# Patient Record
Sex: MALE | Race: WHITE | URBAN - METROPOLITAN AREA
[De-identification: names, ages, dates, MRNs, and addresses within clinical notes are randomized per-mention and may not be internally consistent; named-entity substitution may affect disease eponyms.]

---

## 2017-01-13 ENCOUNTER — OFFICE VISIT (OUTPATIENT)
Dept: INTERNAL MEDICINE CLINIC | Age: 32
End: 2017-01-13

## 2017-01-13 VITALS
WEIGHT: 139 LBS | BODY MASS INDEX: 22.34 KG/M2 | OXYGEN SATURATION: 100 % | HEART RATE: 77 BPM | RESPIRATION RATE: 16 BRPM | DIASTOLIC BLOOD PRESSURE: 77 MMHG | TEMPERATURE: 97.5 F | SYSTOLIC BLOOD PRESSURE: 112 MMHG | HEIGHT: 66 IN

## 2017-01-13 DIAGNOSIS — J06.9 VIRAL UPPER RESPIRATORY TRACT INFECTION: Primary | ICD-10-CM

## 2017-01-13 DIAGNOSIS — R05.9 COUGH: ICD-10-CM

## 2017-01-13 RX ORDER — HYDROCODONE POLISTIREX AND CHLORPHENIRAMINE POLISTIREX 10; 8 MG/5ML; MG/5ML
5 SUSPENSION, EXTENDED RELEASE ORAL
Qty: 70 ML | Refills: 0 | Status: SHIPPED | OUTPATIENT
Start: 2017-01-13 | End: 2017-01-20

## 2017-01-13 NOTE — PATIENT INSTRUCTIONS
Cough: Care Instructions  Your Care Instructions  A cough is your body's response to something that bothers your throat or airways. Many things can cause a cough. You might cough because of a cold or the flu, bronchitis, or asthma. Smoking, postnasal drip, allergies, and stomach acid that backs up into your throat also can cause coughs. A cough is a symptom, not a disease. Most coughs stop when the cause, such as a cold, goes away. You can take a few steps at home to cough less and feel better. Follow-up care is a key part of your treatment and safety. Be sure to make and go to all appointments, and call your doctor if you are having problems. It's also a good idea to know your test results and keep a list of the medicines you take. How can you care for yourself at home? · Drink lots of water and other fluids. This helps thin the mucus and soothes a dry or sore throat. Honey or lemon juice in hot water or tea may ease a dry cough. · Take cough medicine as directed by your doctor. · Prop up your head on pillows to help you breathe and ease a dry cough. · Try cough drops to soothe a dry or sore throat. Cough drops don't stop a cough. Medicine-flavored cough drops are no better than candy-flavored drops or hard candy. · Do not smoke. Avoid secondhand smoke. If you need help quitting, talk to your doctor about stop-smoking programs and medicines. These can increase your chances of quitting for good. When should you call for help? Call 911 anytime you think you may need emergency care. For example, call if:  · You have severe trouble breathing. Call your doctor now or seek immediate medical care if:  · You cough up blood. · You have new or worse trouble breathing. · You have a new or higher fever. · You have a new rash.   Watch closely for changes in your health, and be sure to contact your doctor if:  · You cough more deeply or more often, especially if you notice more mucus or a change in the color of your mucus. · You have new symptoms, such as a sore throat, an earache, or sinus pain. · You do not get better as expected. Where can you learn more? Go to http://preston-gilbert.info/. Enter D279 in the search box to learn more about \"Cough: Care Instructions. \"  Current as of: May 27, 2016  Content Version: 11.1  © 2006-2016 [employer]. Care instructions adapted under license by [employer] (which disclaims liability or warranty for this information). If you have questions about a medical condition or this instruction, always ask your healthcare professional. [place] [address] any warranty or liability for your use of this information. Saline Nasal Washes: Care Instructions  Your Care Instructions  Saline nasal washes help keep the nasal passages open by washing out thick or dried mucus. This simple remedy can help relieve symptoms of allergies, sinusitis, and colds. It also can make the nose feel more comfortable by keeping the mucous membranes moist. You may notice a little burning sensation in your nose the first few times you use the solution, but this usually gets better in a few days. Follow-up care is a key part of your treatment and safety. Be sure to make and go to all appointments, and call your doctor if you are having problems. It's also a good idea to know your test results and keep a list of the medicines you take. How can you care for yourself at home? · You can buy premixed saline solution in a squeeze bottle or other sinus rinse products at a drugstore. Read and follow the instructions on the label. · You also can make your own saline solution by adding 1 teaspoon of salt and 1 teaspoon of baking soda to 2 cups of distilled water. · If you use a homemade solution, pour a small amount into a clean bowl. Using a rubber bulb syringe, squeeze the syringe and place the tip in the salt water.  Pull a small amount of the salt water into the syringe by relaxing your hand. · Sit down with your head tilted slightly back. Do not lie down. Put the tip of the bulb syringe or the squeeze bottle a little way into one of your nostrils. Gently drip or squirt a few drops into the nostril. Repeat with the other nostril. Some sneezing and gagging are normal at first.  · Gently blow your nose. · Wipe the syringe or bottle tip clean after each use. · Repeat this 2 or 3 times a day. · Use nasal washes gently if you have nosebleeds often. When should you call for help? Watch closely for changes in your health, and be sure to contact your doctor if:  · You often get nosebleeds. · You have problems doing the nasal washes. Where can you learn more? Go to http://preston-gilbert.info/. Enter 071 [id number] [id number] in the search box to learn more about \"Saline Nasal Washes: Care Instructions. \"  Current as of: July 29, 2016  Content Version: 11.1  © 5867-6360 [employer]. Care instructions adapted under license by [facility] (which disclaims liability or warranty for this information). If you have questions about a medical condition or this instruction, always ask your healthcare professional. [place] [address] any warranty or liability for your use of this information.

## 2017-01-13 NOTE — LETTER
NOTIFICATION RETURN TO WORK  
 
1/13/2017 9:06 AM 
 
Mr. Rick Nagy 
[facility]  [address] [place] [id number] To Whom It May Concern: 
 
Rick Nagy is currently under the care of [address]. He will return to work on: 1/14/2017. If there are questions or concerns please have the patient contact our office. Sincerely, Kiran Connor PA-C

## 2017-01-13 NOTE — MR AVS SNAPSHOT
Visit Information Date & Time Provider Department Dept. Phone Encounter #  
 1/13/2017  8:30 AM Oneta June [address] [phone] [id number] Follow-up Instructions Return if symptoms worsen or fail to improve. Upcoming Health Maintenance Date Due DTaP/Tdap/Td series (1 - Tdap) 1/1/2005 INFLUENZA AGE 9 TO ADULT 8/1/2016 Allergies as of 1/13/2017  Never Reviewed Severity Noted Reaction Type Reactions Atripla [Efavirenz-emtricitabin-tenofov]  01/13/2017    Nausea and Vomiting With hallucinations Fluoride Susy [Sodium Fluoride]  01/13/2017    Other (comments) Sore throat Current Immunizations  Never Reviewed No immunizations on file. Not reviewed this visit You Were Diagnosed With   
  
 Codes Comments Viral upper respiratory tract infection    -  Primary ICD-10-CM: J06.9, B97.89 ICD-9-CM: 465.9 Cough     ICD-10-CM: R05 ICD-9-CM: 803. 2 Vitals BP Pulse Temp Resp Height(growth percentile) Weight(growth percentile) 112/77 (BP 1 Location: Left arm, BP Patient Position: Sitting) 77 97.5 °F (36.4 °C) (Oral) 16 5' 6\" (1.676 m) 139 lb (63 kg) SpO2 BMI  
  
  
  
 100% 22.44 kg/m2 BMI and BSA Data Body Mass Index Body Surface Area  
 22.44 kg/m 2 1.71 m 2 Your Updated Medication List  
  
   
This list is accurate as of: 1/13/17  9:06 AM.  Always use your most recent med list.  
  
  
  
  
 chlorpheniramine-HYDROcodone 10-8 mg/5 mL suspension Commonly known as:  Ana Favorite Take 5 mL by mouth every twelve (12) hours as needed for Cough for up to 7 days. Max Daily Amount: 10 mL. Prescriptions Printed Refills  
 chlorpheniramine-HYDROcodone (TUSSIONEX) 10-8 mg/5 mL suspension 0 Sig: Take 5 mL by mouth every twelve (12) hours as needed for Cough for up to 7 days. Max Daily Amount: 10 mL. Class: Print Route: Oral  
  
Follow-up Instructions Return if symptoms worsen or fail to improve. Patient Instructions Cough: Care Instructions Your Care Instructions A cough is your body's response to something that bothers your throat or airways. Many things can cause a cough. You might cough because of a cold or the flu, bronchitis, or asthma. Smoking, postnasal drip, allergies, and stomach acid that backs up into your throat also can cause coughs. A cough is a symptom, not a disease. Most coughs stop when the cause, such as a cold, goes away. You can take a few steps at home to cough less and feel better. Follow-up care is a key part of your treatment and safety. Be sure to make and go to all appointments, and call your doctor if you are having problems. It's also a good idea to know your test results and keep a list of the medicines you take. How can you care for yourself at home? · Drink lots of water and other fluids. This helps thin the mucus and soothes a dry or sore throat. Honey or lemon juice in hot water or tea may ease a dry cough. · Take cough medicine as directed by your doctor. · Prop up your head on pillows to help you breathe and ease a dry cough. · Try cough drops to soothe a dry or sore throat. Cough drops don't stop a cough. Medicine-flavored cough drops are no better than candy-flavored drops or hard candy. · Do not smoke. Avoid secondhand smoke. If you need help quitting, talk to your doctor about stop-smoking programs and medicines. These can increase your chances of quitting for good. When should you call for help? Call 911 anytime you think you may need emergency care. For example, call if: 
· You have severe trouble breathing. Call your doctor now or seek immediate medical care if: 
· You cough up blood. · You have new or worse trouble breathing. · You have a new or higher fever. · You have a new rash. Watch closely for changes in your health, and be sure to contact your doctor if: · You cough more deeply or more often, especially if you notice more mucus or a change in the color of your mucus. · You have new symptoms, such as a sore throat, an earache, or sinus pain. · You do not get better as expected. Where can you learn more? Go to http://preston-gilbert.info/. Enter D279 in the search box to learn more about \"Cough: Care Instructions. \" Current as of: May 27, 2016 Content Version: 11.1 © 5239-3932 [employer]. Care instructions adapted under license by [employer] (which disclaims liability or warranty for this information). If you have questions about a medical condition or this instruction, always ask your healthcare professional. [address] any warranty or liability for your use of this information. Saline Nasal Washes: Care Instructions Your Care Instructions Saline nasal washes help keep the nasal passages open by washing out thick or dried mucus. This simple remedy can help relieve symptoms of allergies, sinusitis, and colds. It also can make the nose feel more comfortable by keeping the mucous membranes moist. You may notice a little burning sensation in your nose the first few times you use the solution, but this usually gets better in a few days. Follow-up care is a key part of your treatment and safety. Be sure to make and go to all appointments, and call your doctor if you are having problems. It's also a good idea to know your test results and keep a list of the medicines you take. How can you care for yourself at home? · You can buy premixed saline solution in a squeeze bottle or other sinus rinse products at a drugstore. Read and follow the instructions on the label. · You also can make your own saline solution by adding 1 teaspoon of salt and 1 teaspoon of baking soda to 2 cups of distilled water. · If you use a homemade solution, pour a small amount into a clean bowl. Using a rubber bulb syringe, squeeze the syringe and place the tip in the salt water. Pull a small amount of the salt water into the syringe by relaxing your hand. · Sit down with your head tilted slightly back. Do not lie down. Put the tip of the bulb syringe or the squeeze bottle a little way into one of your nostrils. Gently drip or squirt a few drops into the nostril. Repeat with the other nostril. Some sneezing and gagging are normal at first. 
· Gently blow your nose. · Wipe the syringe or bottle tip clean after each use. · Repeat this 2 or 3 times a day. · Use nasal washes gently if you have nosebleeds often. When should you call for help? Watch closely for changes in your health, and be sure to contact your doctor if: 
· You often get nosebleeds. · You have problems doing the nasal washes. Where can you learn more? Go to http://preston-gilbert.info/. Enter [id number] in the search box to learn more about \"Saline Nasal Washes: Care Instructions. \" Current as of: July 29, 2016 Content Version: 11.1 © 3609-4243 [employer]. Care instructions adapted under license by Lola Pirindola (which disclaims liability or warranty for this information). If you have questions about a medical condition or this instruction, always ask your healthcare professional. [address] any warranty or liability for your use of this information. Introducing [facility]! Mike Kelley introduces [employer] patient portal. Now you can access parts of your medical record, email your doctor's office, and request medication refills online. 1. In your internet browser, go to [email or web address] 2. Click on the First Time User? Click Here link in the Sign In box. You will see the New Member Sign Up page. 3. Enter your [employer] Access Code exactly as it appears below. You will not need to use this code after youve completed the sign-up process.  If you do not sign up before the expiration date, you must request a new code. · [employer] Access Code: [email or web address] Expires: 4/13/2017  9:06 AM 
 
4. Enter the last four digits of your Social Security Number (xxxx) and Date of Birth (mm/dd/yyyy) as indicated and click Submit. You will be taken to the next sign-up page. 5. Create a [employer]cept ID. This will be your [employer] login ID and cannot be changed, so think of one that is secure and easy to remember. 6. Create a [employer] password. You can change your password at any time. 7. Enter your Password Reset Question and Answer. This can be used at a later time if you forget your password. 8. Enter your e-mail address. You will receive e-mail notification when new information is available in [address]. 9. Click Sign Up. You can now view and download portions of your medical record. 10. Click the Download Summary menu link to download a portable copy of your medical information. If you have questions, please visit the Frequently Asked Questions section of the [employer] website. Remember, [employer] is NOT to be used for urgent needs. For medical emergencies, dial 911. Now available from your iPhone and Android! Please provide this summary of care documentation to your next provider. If you have any questions after today's visit, please call [phone].

## 2017-01-13 NOTE — PROGRESS NOTES
HISTORY OF PRESENT ILLNESS  Bjorn Albright is a 34 y.o. male. HPI Comments: Pt presents with 4 days of nasal congestion and 3 days of cough. He also has some ringing in the ears for the past two days. He has been taking an herbal complex (congplex) and guiafenesin for his symptoms. Alkaseltzer cold and flu did not seem to help. 10 mg tivicay  30 mg prezcobix  Daily for HIV    Cold Symptoms   Associated symptoms include sore throat. Pertinent negatives include no chest pain, no chills, no ear pain, no headaches, no myalgias, no nausea and no vomiting. Review of Systems   Constitutional: Positive for fever (2 days ago) and malaise/fatigue. Negative for chills. HENT: Positive for congestion, sore throat and tinnitus. Negative for ear pain. Eyes: Negative for pain and discharge. Respiratory: Positive for cough and sputum production (greenish). Cardiovascular: Negative for chest pain and palpitations. Gastrointestinal: Negative for nausea and vomiting. Musculoskeletal: Negative for back pain and myalgias. Skin: Negative for rash. Neurological: Positive for weakness. Negative for dizziness and headaches. Visit Vitals    /77 (BP 1 Location: Left arm, BP Patient Position: Sitting)    Pulse 77    Temp 97.5 °F (36.4 °C) (Oral)    Resp 16    Ht 5' 6\" (1.676 m)    Wt 139 lb (63 kg)    SpO2 100%    BMI 22.44 kg/m2       Physical Exam   Constitutional: He is oriented to person, place, and time. Vital signs are normal. He appears well-developed and well-nourished. He is cooperative. He does not appear ill. No distress. HENT:   Head: Normocephalic and atraumatic. Right Ear: External ear and ear canal normal. Tympanic membrane is injected (mild erythema noted on periphery of TM and bordering ear ossicles). Left Ear: Tympanic membrane, external ear and ear canal normal. Tympanic membrane is not injected. Nose: Nose normal. No mucosal edema or rhinorrhea (scant mucus).    Mouth/Throat: Uvula is midline, oropharynx is clear and moist and mucous membranes are normal.   Eyes: Conjunctivae are normal.   Neck: Neck supple. Cardiovascular: Normal rate, regular rhythm and normal heart sounds. Exam reveals no gallop and no friction rub. No murmur heard. Pulses:       Radial pulses are 2+ on the right side, and 2+ on the left side. Pulmonary/Chest: Effort normal and breath sounds normal. No respiratory distress. He has no decreased breath sounds. He has no wheezes. He has no rhonchi. He has no rales. Lymphadenopathy:        Head (right side): Tonsillar adenopathy present. No submental, no submandibular, no preauricular, no posterior auricular and no occipital adenopathy present. Head (left side): Tonsillar adenopathy present. No submental, no submandibular, no preauricular, no posterior auricular and no occipital adenopathy present. He has no cervical adenopathy. Neurological: He is alert and oriented to person, place, and time. Skin: Skin is warm and dry. He is not diaphoretic. Psychiatric: He has a normal mood and affect. His speech is normal and behavior is normal. Thought content normal.   Nursing note and vitals reviewed. ASSESSMENT and PLAN    ICD-10-CM ICD-9-CM    1. Viral upper respiratory tract infection J06.9 465.9     B97.89     2. Cough R05 786.2 chlorpheniramine-HYDROcodone (TUSSIONEX) 10-8 mg/5 mL suspension     Provided after-visit information on  Cough and sinus rinse. Reviewed reasons to return or seek emergency care.     Tobin Cornejo PA-C

## 2017-01-31 ENCOUNTER — OFFICE VISIT (OUTPATIENT)
Dept: INTERNAL MEDICINE CLINIC | Age: 32
End: 2017-01-31

## 2017-01-31 VITALS
DIASTOLIC BLOOD PRESSURE: 93 MMHG | HEIGHT: 66 IN | SYSTOLIC BLOOD PRESSURE: 133 MMHG | TEMPERATURE: 95.6 F | OXYGEN SATURATION: 99 % | RESPIRATION RATE: 16 BRPM | HEART RATE: 77 BPM | BODY MASS INDEX: 22.5 KG/M2 | WEIGHT: 140 LBS

## 2017-01-31 DIAGNOSIS — H66.92 OTITIS, LEFT: Primary | ICD-10-CM

## 2017-01-31 RX ORDER — NEOMYCIN SULFATE, POLYMYXIN B SULFATE AND HYDROCORTISONE 10; 3.5; 1 MG/ML; MG/ML; [USP'U]/ML
3 SUSPENSION/ DROPS AURICULAR (OTIC) 4 TIMES DAILY
Qty: 10 ML | Refills: 0 | Status: SHIPPED | OUTPATIENT
Start: 2017-01-31 | End: 2017-02-10

## 2017-01-31 NOTE — PROGRESS NOTES
Pt presented today with left ear fullness and ringing x 3-4 days. Has pt had any falls since last visit? No.  Pt preferred language for health care discussion is english. Advanced Directive? No    Is someone accompanying this pt? No    Is the patient using any DME equipment during OV? No      1. Have you been to the ER, urgent care clinic since your last visit? Hospitalized since your last visit? No    2. Have you seen or consulted any other health care providers outside of the [address] [place] [address] since your last visit? Include any colon screening. No      Pt has a reminder for a \"due or due soon\" health maintenance. I have asked that he contact his primary care provider for follow-up on this health maintenance.

## 2017-01-31 NOTE — MR AVS SNAPSHOT
Visit Information Date & Time Provider Department Dept. Phone Encounter #  
 1/31/2017 11:15 AM Jeanette Law NP [address] [phone] [id number] Upcoming Health Maintenance Date Due DTaP/Tdap/Td series (1 - Tdap) 1/1/2005 INFLUENZA AGE 9 TO ADULT 8/1/2016 Allergies as of 1/31/2017  Review Complete On: 1/31/2017 By: Jeanette Law NP Severity Noted Reaction Type Reactions Atripla [Efavirenz-emtricitabin-tenofov]  01/13/2017    Nausea and Vomiting With hallucinations Fluoride Susy [Sodium Fluoride]  01/13/2017    Other (comments) Sore throat Current Immunizations  Never Reviewed No immunizations on file. Not reviewed this visit You Were Diagnosed With   
  
 Codes Comments Otitis, left    -  Primary ICD-10-CM: H66.92 
ICD-9-CM: 382. 9 Vitals BP Pulse Temp Resp Height(growth percentile) Weight(growth percentile) (!) 133/93 (BP 1 Location: Right arm, BP Patient Position: Sitting) 77 95.6 °F (35.3 °C) (Oral) 16 5' 6\" (1.676 m) 140 lb (63.5 kg) SpO2 BMI Smoking Status 99% 22.6 kg/m2 Current Some Day Smoker Vitals History BMI and BSA Data Body Mass Index Body Surface Area  
 22.6 kg/m 2 1.72 m 2 Preferred Pharmacy Pharmacy Name Phone [facility] [address] Your Updated Medication List  
  
   
This list is accurate as of: 1/31/17 12:03 PM.  Always use your most recent med list.  
  
  
  
  
 neomycin-polymyxin-hydrocortisone (buffered) 3.5-10,000-1 mg/mL-unit/mL-% otic suspension Commonly known as:  Sim Nivia Administer 3 Drops into each ear four (4) times daily for 10 days. Indications: Otitis Externa PREZCOBIX PO Take 30 mg by mouth daily. Indications: HIV  
  
 TIVICAY 10 mg Tab Generic drug:  dolutegravir Take  by mouth. Prescriptions Sent to Pharmacy Refills neomycin-polymyxin-hydrocortisone, buffered, (PEDIOTIC) 3.5-10,000-1 mg/mL-unit/mL-% otic suspension 0 Sig: Administer 3 Drops into each ear four (4) times daily for 10 days. Indications: Otitis Externa Class: Normal  
 Pharmacy: [facility], [address] #: [phone] Route: Both Ears Patient Instructions Swimmer's Ear: Care Instructions Your Care Instructions Swimmer's ear (otitis externa) is inflammation or infection of the ear canal. This is the passage that leads from the outer ear to the eardrum. Any water, sand, or other debris that gets into the ear canal and stays there can cause swimmer's ear. Putting cotton swabs or other items in the ear to clean it can also cause this problem. Swimmer's ear can be very painful. But you can treat the pain and infection with medicines. You should feel better in a few days. Follow-up care is a key part of your treatment and safety. Be sure to make and go to all appointments, and call your doctor if you are having problems. It's also a good idea to know your test results and keep a list of the medicines you take. How can you care for yourself at home? Cleaning and care · Use antibiotic drops as your doctor directs. · Do not insert ear drops (other than the antibiotic ear drops) or anything else into the ear unless your doctor has told you to. · Avoid getting water in the ear until the problem clears up. Use cotton lightly coated with petroleum jelly as an earplug. Do not use plastic earplugs. · Use a hair dryer set on low to carefully dry the ear after you shower. · To ease ear pain, hold a warm washcloth against your ear. · Take pain medicines exactly as directed. ¨ If the doctor gave you a prescription medicine for pain, take it as prescribed. ¨ If you are not taking a prescription pain medicine, ask your doctor if you can take an over-the-counter medicine. Inserting ear drops · Warm the drops to body temperature by rolling the container in your hands. Or you can place it in a cup of warm water for a few minutes. · Lie down, with your ear facing up. · Place drops inside the ear. Follow your doctor's instructions (or the directions on the label) for how many drops to use. Gently wiggle the outer ear or pull the ear up and back to help the drops get into the ear. · It's important to keep the liquid in the ear canal for 3 to 5 minutes. When should you call for help? Call your doctor now or seek immediate medical care if: 
· You have a new or higher fever. · You have new or worse pain, swelling, warmth, or redness around or behind your ear. · You have new or increasing pus or blood draining from your ear. Watch closely for changes in your health, and be sure to contact your doctor if: 
· You are not getting better after 2 days (48 hours). Where can you learn more? Go to http://preston-gilbert.info/. Enter C706 in the search box to learn more about \"Swimmer's Ear: Care Instructions. \" Current as of: July 29, 2016 Content Version: 11.1 © 2494-2612 [employer]. Care instructions adapted under license by [employer] (which disclaims liability or warranty for this information). If you have questions about a medical condition or this instruction, always ask your healthcare professional. [place] [address] any warranty or liability for your use of this information. Introducing [facility]! [facility] introduces [employer] patient portal. Now you can access parts of your medical record, email your doctor's office, and request medication refills online. 1. In your internet browser, go to [email or web address] 2. Click on the First Time User? Click Here link in the Sign In box. You will see the New Member Sign Up page. 3. Enter your [employer] Access Code exactly as it appears below.  You will not need to use this code after youve completed the sign-up process. If you do not sign up before the expiration date, you must request a new code. · [employer] Access Code: [email or web address] Expires: 4/13/2017  9:06 AM 
 
4. Enter the last four digits of your Social Security Number (xxxx) and Date of Birth (mm/dd/yyyy) as indicated and click Submit. You will be taken to the next sign-up page. 5. Create a [employer] ID. This will be your [employer] login ID and cannot be changed, so think of one that is secure and easy to remember. 6. Create a [employer] password. You can change your password at any time. 7. Enter your Password Reset Question and Answer. This can be used at a later time if you forget your password. 8. Enter your e-mail address. You will receive e-mail notification when new information is available in [address]. 9. Click Sign Up. You can now view and download portions of your medical record. 10. Click the Download Summary menu link to download a portable copy of your medical information. If you have questions, please visit the Frequently Asked Questions section of the [employer] website. Remember, [employer] is NOT to be used for urgent needs. For medical emergencies, dial 911. Now available from your iPhone and Android! Please provide this summary of care documentation to your next provider. If you have any questions after today's visit, please call [phone].

## 2017-01-31 NOTE — PATIENT INSTRUCTIONS

## 2017-01-31 NOTE — PROGRESS NOTES
HISTORY OF PRESENT ILLNESS  Kita Fitzgerald is a 34 y.o. male. Patient presents with left ear fullness and ringing,decreased left ear hearing x 3-4 days, denies pain, states \" it feels like there is water in your ear from the shower\", states he is just recovering from a URI. Patient denies any ear trauma,ear bleeding or draimage HA,sore throat, dizziness, imbalance,fever,chills, NVD,abd pain, SOB,CP. Ear Fullness    The history is provided by the patient. This is a new problem. The current episode started more than 2 days ago. The problem occurs constantly. Patient complains that the left ear is affected. There has been no fever. The pain is at a severity of 0/10. The patient is experiencing no pain. Associated symptoms include hearing loss. Pertinent negatives include no headaches, no sore throat, no abdominal pain, no diarrhea, no vomiting, no neck pain, no cough and no rash. Review of Systems   HENT: Positive for hearing loss and tinnitus. Negative for sore throat. Eyes: Negative. Respiratory: Negative for cough. Gastrointestinal: Negative for abdominal pain, diarrhea and vomiting. Musculoskeletal: Negative. Negative for neck pain. Skin: Negative for rash. Neurological: Negative. Negative for headaches. Psychiatric/Behavioral: Negative. Physical Exam   Constitutional: He is oriented to person, place, and time. He appears well-developed and well-nourished. BP (!) 133/93 (BP 1 Location: Right arm, BP Patient Position: Sitting)  Pulse 77  Temp 95.6 °F (35.3 °C) (Oral)   Resp 16  Ht 5' 6\" (1.676 m)  Wt 140 lb (63.5 kg)  SpO2 99%  BMI 22.6 kg/m2     HENT:   Head: Normocephalic and atraumatic. Left Ear: There is swelling and tenderness. Tympanic membrane is erythematous. Decreased hearing is noted. Eyes: Conjunctivae and EOM are normal. Pupils are equal, round, and reactive to light. Neck: Normal range of motion. Cardiovascular: Normal rate.     Pulmonary/Chest: Effort normal and breath sounds normal.   Abdominal: Soft. Bowel sounds are normal.   Musculoskeletal: Normal range of motion. Neurological: He is alert and oriented to person, place, and time. GCS eye subscore is 4. GCS verbal subscore is 5. GCS motor subscore is 6. Skin: Skin is warm and dry. Psychiatric: He has a normal mood and affect. His speech is normal and behavior is normal. Judgment and thought content normal. Cognition and memory are normal.   Vitals reviewed. ASSESSMENT and PLAN    ICD-10-CM ICD-9-CM    1. Otitis, left H66.92 382.9 neomycin-polymyxin-hydrocortisone, buffered, (PEDIOTIC) 3.5-10,000-1 mg/mL-unit/mL-% otic suspension     Encounter Diagnoses   Name Primary?  Otitis, left Yes     Orders Placed This Encounter    neomycin-polymyxin-hydrocortisone, buffered, (PEDIOTIC) 3.5-10,000-1 mg/mL-unit/mL-% otic suspension     Orders Placed This Encounter    neomycin-polymyxin-hydrocortisone, buffered, (PEDIOTIC) 3.5-10,000-1 mg/mL-unit/mL-% otic suspension     Sig: Administer 3 Drops into each ear four (4) times daily for 10 days. Indications: Otitis Externa     Dispense:  10 mL     Refill:  0     Orders Placed This Encounter    neomycin-polymyxin-hydrocortisone, buffered, (PEDIOTIC) 3.5-10,000-1 mg/mL-unit/mL-% otic suspension     Carmen Dakins was seen today for ear fullness. Diagnoses and all orders for this visit:    Otitis, left  -     neomycin-polymyxin-hydrocortisone, buffered, (PEDIOTIC) 3.5-10,000-1 mg/mL-unit/mL-% otic suspension; Administer 3 Drops into each ear four (4) times daily for 10 days. Indications: Otitis Externa      Follow-up Disposition:  Return if symptoms worsen or fail to improve.   current treatment plan is effective, no change in therapy

## 2017-01-31 NOTE — PROGRESS NOTES
Patient presents with left ear fullness and ringing,decreased left ear hearing x 3-4 days, denies pain, states \" it feels like there is water in your ear from the shower\", states he is just recovering from a URI. Patient denies any ear trauma,ear bleeding or draimage HA,sore throat, dizziness, imbalance,fever,chills, NVD,abd pain, SOB,CP.

## 2017-03-04 ENCOUNTER — OFFICE VISIT (OUTPATIENT)
Dept: INTERNAL MEDICINE CLINIC | Age: 32
End: 2017-03-04

## 2017-03-04 VITALS
WEIGHT: 136.2 LBS | OXYGEN SATURATION: 98 % | HEIGHT: 66 IN | HEART RATE: 112 BPM | DIASTOLIC BLOOD PRESSURE: 97 MMHG | SYSTOLIC BLOOD PRESSURE: 139 MMHG | BODY MASS INDEX: 21.89 KG/M2 | TEMPERATURE: 96.4 F

## 2017-03-04 DIAGNOSIS — J30.9 ALLERGIC RHINITIS, UNSPECIFIED ALLERGIC RHINITIS TRIGGER, UNSPECIFIED RHINITIS SEASONALITY: Primary | ICD-10-CM

## 2017-03-04 RX ORDER — LEVOCETIRIZINE DIHYDROCHLORIDE 5 MG/1
5 TABLET, FILM COATED ORAL DAILY
Qty: 30 TAB | Refills: 0 | Status: SHIPPED | OUTPATIENT
Start: 2017-03-04 | End: 2018-02-08 | Stop reason: SDUPTHER

## 2017-03-04 RX ORDER — FLUNISOLIDE 0.25 MG/ML
2 SOLUTION NASAL 2 TIMES DAILY
Qty: 25 ML | Refills: 0 | Status: SHIPPED | OUTPATIENT
Start: 2017-03-04 | End: 2018-02-08

## 2017-03-04 NOTE — PROGRESS NOTES
Chief Complaint   Patient presents with    Nasal Congestion       HPI:     Romina Horan is a 34 y.o.  male with history of HIV  here for the above complaint. He has nasal drip since Feb. This is constant nasal drip. He has tried mucinex OTC . He gets postnasal drainage that gets clear to sometimes light and green. No fevers, chills, but has night sweats. He denies any chest pain, shortness of breath, abdominal pain, headaches or dizziness. He said the nasal drip, coughing and sneezing happens mostly in doors. He tried zyrtec and loratadine, but no allegra. 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 He thinks this could be causing his symptoms. Past Medical History:   Diagnosis Date    [facility]      History reviewed. No pertinent surgical history. Current Outpatient Prescriptions   Medication Sig    levocetirizine (XYZAL) 5 mg tablet Take 1 Tab by mouth daily.  flunisolide (NASAREL) 25 mcg (0.025 %) spry 2 Sprays by Both Nostrils route two (2) times a day.  DARUNAVIR/COBICISTAT (PREZCOBIX PO) Take 30 mg by mouth daily. Indications: HIV    dolutegravir (TIVICAY) 10 mg tab Take  by mouth. No current facility-administered medications for this visit. Health Maintenance   Topic Date Due    Pneumococcal 19-64 Medium Risk (1 of 1 - PPSV23) 01/01/2003    DTaP/Tdap/Td series (1 - Tdap) 01/01/2005    INFLUENZA AGE 9 TO ADULT  08/01/2016       There is no immunization history on file for this patient. No LMP for male patient. Allergies and Intolerances:    Allergies   Allergen Reactions    Atripla [Efavirenz-Emtricitabin-Tenofov] Nausea and Vomiting     With hallucinations    Fluoride Susy [Sodium Fluoride] Other (comments)     Sore throat         Family History:   Family History   Problem Relation Age of Onset    Diabetes Mother     Heart defect Mother     Cancer Mother     Diabetes Father        Social History:   He  reports that he has been smoking. He has never used smokeless tobacco.  He  reports that he does not drink alcohol. ·     Objective:   Physical exam:   Visit Vitals    BP (!) 139/97 (BP 1 Location: Right arm, BP Patient Position: Sitting)    Pulse (!) 112    Temp 96.4 °F (35.8 °C) (Oral)    Ht 5' 6\" (1.676 m)    Wt 136 lb 3.2 oz (61.8 kg)    SpO2 98%    BMI 21.98 kg/m2        Generally: Pleasant male in no acute distress  HEENT Exam: Head: atraumatic               Eyes: PERRLA    Ears: bilaterally normal TM, no erythema or exudate, normal light reflex, left ear: fluid behind TM    Nares: moist mucosa, no erythema    Mouth: Clear, no erythema or exudate    Neck: supple, no LAD  Cardiac Exam: regular, rate, and rhythm. Normal S1 and S2. No murmurs, gallops, or rubs  Pulmonary exam: Clear to auscultation bilaterally      LABS/Radiological Tests:  none      ASSESSMENT/PLAN:    1. Allergic rhinitis, unspecified allergic rhinitis trigger, unspecified rhinitis seasonality: think this is what is causing his nasal drip. Checked epocrates and these 2 medication do not interact with his HIV meds. -     levocetirizine (XYZAL) 5 mg tablet; Take 1 Tab by mouth daily. -     flunisolide (NASAREL) 25 mcg (0.025 %) spry; 2 Sprays by Both Nostrils route two (2) times a day. 2.   Requested Prescriptions     Signed Prescriptions Disp Refills    levocetirizine (XYZAL) 5 mg tablet 30 Tab 0     Sig: Take 1 Tab by mouth daily.  flunisolide (NASAREL) 25 mcg (0.025 %) spry 25 mL 0     Si Sprays by Both Nostrils route two (2) times a day. 3. Patient verbalized understanding and agreement with the plan. 4. Patient was given an after-visit summary. 5. Follow-up Disposition:  Return if symptoms worsen or fail to improve. or sooner if worsening symptoms.                 Isa Miranda MD

## 2017-03-04 NOTE — PATIENT INSTRUCTIONS
1) follow-up as needed or sooner if worsening symptoms. Allergies: Care Instructions  Your Care Instructions  Allergies occur when your body's defense system (immune system) overreacts to certain substances. The immune system treats a harmless substance as if it were a harmful germ or virus. Many things can cause this overreaction, including pollens, medicine, food, dust, animal dander, and mold. Allergies can be mild or severe. Mild allergies can be managed with home treatment. But medicine may be needed to prevent problems. Managing your allergies is an important part of staying healthy. Your doctor may suggest that you have allergy testing to help find out what is causing your allergies. When you know what things trigger your symptoms, you can avoid them. This can prevent allergy symptoms and other health problems. For severe allergies that cause reactions that affect your whole body (anaphylactic reactions), your doctor may prescribe a shot of epinephrine to carry with you in case you have a severe reaction. Learn how to give yourself the shot and keep it with you at all times. Make sure it is not . Follow-up care is a key part of your treatment and safety. Be sure to make and go to all appointments, and call your doctor if you are having problems. It's also a good idea to know your test results and keep a list of the medicines you take. How can you care for yourself at home? · If you have been told by your doctor that dust or dust mites are causing your allergy, decrease the dust around your bed:  [facility] sheets, pillowcases, and other bedding in hot water every week. ¨ Use dust-proof covers for pillows, duvets, and mattresses. Avoid plastic covers because they tear easily and do not \"breathe. \" Wash as instructed on the label. ¨ Do not use any blankets and pillows that you do not need. ¨ Use blankets that you can wash in your washing machine.   ¨ Consider removing drapes and carpets, which attract and hold dust, from your bedroom. · If you are allergic to house dust and mites, do not use home humidifiers. Your doctor can suggest ways you can control dust and mites. · Look for signs of cockroaches. Cockroaches cause allergic reactions. Use cockroach baits to get rid of them. Then, clean your home well. Cockroaches like areas where grocery bags, newspapers, empty bottles, or cardboard boxes are stored. Do not keep these inside your home, and keep trash and food containers sealed. Seal off any spots where cockroaches might enter your home. · If you are allergic to mold, get rid of furniture, rugs, and drapes that smell musty. Check for mold in the bathroom. · If you are allergic to outdoor pollen or mold spores, use air-conditioning. Change or clean all filters every month. Keep windows closed. · If you are allergic to pollen, stay inside when pollen counts are high. Use a vacuum  with a HEPA filter or a double-thickness filter at least two times each week. · Stay inside when air pollution is bad. Avoid paint fumes, perfumes, and other strong odors. · Avoid conditions that make your allergies worse. Stay away from smoke. Do not smoke or let anyone else smoke in your house. Do not use fireplaces or wood-burning stoves. · If you are allergic to your pets, change the air filter in your furnace every month. Use high-efficiency filters. · If you are allergic to pet dander, keep pets outside or out of your bedroom. Old carpet and cloth furniture can hold a lot of animal dander. You may need to replace them. When should you call for help? Give an epinephrine shot if:  · You think you are having a severe allergic reaction. · You have symptoms in more than one body area, such as mild nausea and an itchy mouth. After giving an epinephrine shot call 911, even if you feel better. Call 911 if:  · You have symptoms of a severe allergic reaction.  These may include:  ¨ Sudden raised, red areas (hives) all over your body. ¨ Swelling of the throat, mouth, lips, or tongue. ¨ Trouble breathing. ¨ Passing out (losing consciousness). Or you may feel very lightheaded or suddenly feel weak, confused, or restless. · You have been given an epinephrine shot, even if you feel better. Call your doctor now or seek immediate medical care if:  · You have symptoms of an allergic reaction, such as:  ¨ A rash or hives (raised, red areas on the skin). ¨ Itching. ¨ Swelling. ¨ Belly pain, nausea, or vomiting. Watch closely for changes in your health, and be sure to contact your doctor if:  · You do not get better as expected. Where can you learn more? Go to http://preston-gilbert.info/. Enter W887 in the search box to learn more about \"Allergies: Care Instructions. \"  Current as of: February 12, 2016  Content Version: 11.1  © 9835-9195 [employer]. Care instructions adapted under license by [employer] (which disclaims liability or warranty for this information). If you have questions about a medical condition or this instruction, always ask your healthcare professional. [place] [address] any warranty or liability for your use of this information. Managing Your Allergies: Care Instructions  Your Care Instructions  Managing your allergies is an important part of staying healthy. Your doctor will help you find out what may be causing the allergies. Common causes of allergy symptoms are house dust and dust mites, animal dander, mold, and pollen. As soon as you know what triggers your symptoms, try to reduce your exposure to your triggers. This can help prevent allergy symptoms, asthma, and other health problems. Ask your doctor about allergy medicine or immunotherapy. These treatments may help reduce or prevent allergy symptoms. Follow-up care is a key part of your treatment and safety.  Be sure to make and go to all appointments, and call your doctor if you are having problems. It's also a good idea to know your test results and keep a list of the medicines you take. How can you care for yourself at home? · If you think that dust or dust mites are causing your allergies:  ¨ Wash sheets, pillowcases, and other bedding every week in hot water. ¨ Use airtight, dust-proof covers for pillows, duvets, and mattresses. Avoid plastic covers, because they tend to tear quickly and do not \"breathe. \" Wash according to the instructions. ¨ Remove extra blankets and pillows that you don't need. ¨ Use blankets that are machine-washable. ¨ Don't use home humidifiers. They can help mites live longer. · Use air-conditioning. Change or clean all filters every month. Keep windows closed. Use high-efficiency air filters. Don't use window or attic fans, which draw dust into the air. · If you're allergic to pet dander, keep pets outside or, at the very least, out of your bedroom. Old carpet and cloth-covered furniture can hold a lot of animal dander. You may need to replace them. · Look for signs of cockroaches. Use cockroach baits to get rid of them. Then clean your home well. · If you're allergic to mold, don't keep indoor plants, because molds can grow in soil. Get rid of furniture, rugs, and drapes that smell musty. Check for mold in the bathroom. · If you're allergic to pollen, stay inside when pollen counts are high. · Don't smoke or let anyone else smoke in your house. Don't use fireplaces or wood-burning stoves. Avoid paint fumes, perfumes, and other strong odors. When should you call for help? Give an epinephrine shot if:  · You think you are having a severe allergic reaction. · You have symptoms in more than one body area, such as mild nausea and an itchy mouth. After giving an epinephrine shot call 911, even if you feel better. Call 911 if:  · You have symptoms of a severe allergic reaction.  These may include:  ¨ Sudden raised, red areas (hives) all over your body.  ¨ Swelling of the throat, mouth, lips, or tongue. ¨ Trouble breathing. ¨ Passing out (losing consciousness). Or you may feel very lightheaded or suddenly feel weak, confused, or restless. · You have been given an epinephrine shot, even if you feel better. Call your doctor now or seek immediate medical care if:  · You have symptoms of an allergic reaction, such as:  ¨ A rash or hives (raised, red areas on the skin). ¨ Itching. ¨ Swelling. ¨ Belly pain, nausea, or vomiting. Watch closely for changes in your health, and be sure to contact your doctor if:  · Your allergies get worse. · You need help controlling your allergies. · You have questions about allergy testing. · You do not get better as expected. Where can you learn more? Go to http://preston-gilbert.info/. Enter L249 in the search box to learn more about \"Managing Your Allergies: Care Instructions. \"  Current as of: February 12, 2016  Content Version: 11.1  © 9035-9985 [employer]. Care instructions adapted under license by [employer] (which disclaims liability or warranty for this information). If you have questions about a medical condition or this instruction, always ask your healthcare professional. [place] [address] any warranty or liability for your use of this information. Using a Nasal Steroid Spray: Care Instructions  Your Care Instructions    Your doctor may suggest using a corticosteroid nasal spray for your allergy symptoms or sinus problems. These sprays reduce the swelling inside the nose and sinuses. Unlike decongestant nasal sprays, steroid sprays won't lead to more swelling when you stop taking them. These sprays start working in a few days, but it may take several weeks before you get the full effect. Most side effects are minor. The most common complaint is a burning feeling in the nose right after the spray is used. Some people get nosebleeds.   Follow-up care is a key part of your treatment and safety. Be sure to make and go to all appointments, and call your doctor if you are having problems. It's also a good idea to know your test results and keep a list of the medicines you take. How can you care for yourself at home? Here are some tips for using these sprays:  · You may need to prime the sprayer before you use it. This means spraying it into the air a few times to make sure you get the right amount of medicine. Follow the directions on the label. · Blow your nose before you spray. This will help clear out your nostrils. · Gently sniff the medicine into your nose as you spray. Don't snort, or the medicine will go all the way into your throat where it won't do much good. · Aim the nozzle straight toward the back of your nose. This will help keep the medicine from irritating the inside walls of your nostril, especially your septum (the wall that separates your left and right nostrils). · Don't blow your nose for 10 minutes or so after you spray. And try not to sneeze. · Be safe with medicines. Use this medicine exactly as prescribed. Call your doctor if you think you are having a problem with your medicine. · Clean your sprayer once a week. Read the label to learn how. When should you call for help? Call your doctor now or seek immediate medical care if:  · You don't understand how to use the medicine. · Your symptoms aren't getting better as expected. · You think you are having a side effect from the medicine. Where can you learn more? Go to http://preston-gilbert.info/. Enter G774 in the search box to learn more about \"Using a Nasal Steroid Spray: Care Instructions. \"  Current as of: July 29, 2016  Content Version: 11.1  © 5080-3614 [employer]. Care instructions adapted under license by [employer] (which disclaims liability or warranty for this information).  If you have questions about a medical condition or this instruction, always ask your healthcare professional. [place] [address] any warranty or liability for your use of this information.

## 2017-03-04 NOTE — PROGRESS NOTES
Pt presented today with nasal drip x 1 month . Has pt had any falls since last visit? no.  Pt preferred language for health care discussion is english. Advanced Directive? no    Is someone accompanying this pt? no    Is the patient using any DME equipment during OV? no      1. Have you been to the ER, urgent care clinic since your last visit? Hospitalized since your last visit? No    2. Have you seen or consulted any other health care providers outside of the [address] [place] [address] since your last visit? Include any pap smears or colon screening. No      Patient  has a reminder for a \"due or due soon\" health maintenance. I have asked that he contact his primary care provider for follow-up on this health maintenance.

## 2017-03-04 NOTE — LETTER
NOTIFICATION RETURN TO WORK / SCHOOL 
 
3/4/2017 3:42 PM 
 
Mr. Paulie Cheng 
[address] [facility] [address] [facility] [phone] To Whom It May Concern: 
 
Paulie Cheng is currently under the care of Ritika Sandoval. He will return to work 3/7/17, Tuesday. If there are questions or concerns please have the patient contact our office. Sincerely, Hilary Wright MD

## 2017-03-04 NOTE — MR AVS SNAPSHOT
Visit Information Date & Time Provider Department Dept. Phone Encounter #  
 3/4/2017  3:45 PM MD [address]i [address] [phone] [id number] Follow-up Instructions Return if symptoms worsen or fail to improve. Your Appointments 3/4/2017  3:45 PM  
Office Visit with MD Ricki [address] [facility] Appt Note: nasal drip  
 [address]  
  
   
 [address] [facility] Upcoming Health Maintenance Date Due Pneumococcal 19-64 Medium Risk (1 of 1 - PPSV23) 1/1/2003 DTaP/Tdap/Td series (1 - Tdap) 1/1/2005 INFLUENZA AGE 9 TO ADULT 8/1/2016 Allergies as of 3/4/2017  Review Complete On: 3/4/2017 By: Paulie Stewart MD  
  
 Severity Noted Reaction Type Reactions Atripla [Efavirenz-emtricitabin-tenofov]  01/13/2017    Nausea and Vomiting With hallucinations Fluoride Susy [Sodium Fluoride]  01/13/2017    Other (comments) Sore throat Current Immunizations  Never Reviewed No immunizations on file. Not reviewed this visit You Were Diagnosed With   
  
 Codes Comments Allergic rhinitis, unspecified allergic rhinitis trigger, unspecified rhinitis seasonality    -  Primary ICD-10-CM: J30.9 ICD-9-CM: 477.9 Vitals BP Pulse Temp Height(growth percentile) Weight(growth percentile) SpO2  
 (!) 139/97 (BP 1 Location: Right arm, BP Patient Position: Sitting) (!) 112 96.4 °F (35.8 °C) (Oral) 5' 6\" (1.676 m) 136 lb 3.2 oz (61.8 kg) 98% BMI Smoking Status 21.98 kg/m2 Current Some Day Smoker Vitals History BMI and BSA Data Body Mass Index Body Surface Area  
 21.98 kg/m 2 1.7 m 2 Preferred Pharmacy Pharmacy Name Phone [facility] [address], [address] [facility] Your Updated Medication List  
  
   
 This list is accurate as of: 3/4/17  3:42 PM.  Always use your most recent med list.  
  
  
  
  
 flunisolide 25 mcg (0.025 %) Spry Commonly known as:  NASAREL  
2 Sprays by Both Nostrils route two (2) times a day. levocetirizine 5 mg tablet Commonly known as:  Fern Roch Take 1 Tab by mouth daily. PREZCOBIX PO Take 30 mg by mouth daily. Indications: HIV  
  
 TIVICAY 10 mg Tab Generic drug:  dolutegravir Take  by mouth. Prescriptions Sent to Pharmacy Refills  
 levocetirizine (XYZAL) 5 mg tablet 0 Sig: Take 1 Tab by mouth daily. Class: Normal  
 Pharmacy: [address] [place] [address], [address] [place] [address] Ph #: [phone] Route: Oral  
 flunisolide (NASAREL) 25 mcg (0.025 %) spry 0 Si Sprays by Both Nostrils route two (2) times a day. Class: Normal  
 Pharmacy: [address] [place] [address], [address] [place] [address] Ph #: [phone] Route: Both Nostrils Follow-up Instructions Return if symptoms worsen or fail to improve. Patient Instructions 1) follow-up as needed or sooner if worsening symptoms. Allergies: Care Instructions Your Care Instructions Allergies occur when your body's defense system (immune system) overreacts to certain substances. The immune system treats a harmless substance as if it were a harmful germ or virus. Many things can cause this overreaction, including pollens, medicine, food, dust, animal dander, and mold. Allergies can be mild or severe. Mild allergies can be managed with home treatment. But medicine may be needed to prevent problems. Managing your allergies is an important part of staying healthy. Your doctor may suggest that you have allergy testing to help find out what is causing your allergies. When you know what things trigger your symptoms, you can avoid them. This can prevent allergy symptoms and other health problems. For severe allergies that cause reactions that affect your whole body (anaphylactic reactions), your doctor may prescribe a shot of epinephrine to carry with you in case you have a severe reaction. Learn how to give yourself the shot and keep it with you at all times. Make sure it is not . Follow-up care is a key part of your treatment and safety. Be sure to make and go to all appointments, and call your doctor if you are having problems. It's also a good idea to know your test results and keep a list of the medicines you take. How can you care for yourself at home? · If you have been told by your doctor that dust or dust mites are causing your allergy, decrease the dust around your bed: 
[facility] [employer] sheets, pillowcases, and other bedding in hot water every week. ¨ Use dust-proof covers for pillows, duvets, and mattresses. Avoid plastic covers because they tear easily and do not \"breathe. \" Wash as instructed on the label. ¨ Do not use any blankets and pillows that you do not need. ¨ Use blankets that you can wash in your washing machine. ¨ Consider removing drapes and carpets, which attract and hold dust, from your bedroom. · If you are allergic to house dust and mites, do not use home humidifiers. Your doctor can suggest ways you can control dust and mites. · Look for signs of cockroaches. Cockroaches cause allergic reactions. Use cockroach baits to get rid of them. Then, clean your home well. Cockroaches like areas where grocery bags, newspapers, empty bottles, or cardboard boxes are stored. Do not keep these inside your home, and keep trash and food containers sealed. Seal off any spots where cockroaches might enter your home. · If you are allergic to mold, get rid of furniture, rugs, and drapes that smell musty. Check for mold in the bathroom. · If you are allergic to outdoor pollen or mold spores, use air-conditioning. Change or clean all filters every month. Keep windows closed. · If you are allergic to pollen, stay inside when pollen counts are high. Use a vacuum  with a HEPA filter or a double-thickness filter at least two times each week. · Stay inside when air pollution is bad. Avoid paint fumes, perfumes, and other strong odors. · Avoid conditions that make your allergies worse. Stay away from smoke. Do not smoke or let anyone else smoke in your house. Do not use fireplaces or wood-burning stoves. · If you are allergic to your pets, change the air filter in your furnace every month. Use high-efficiency filters. · If you are allergic to pet dander, keep pets outside or out of your bedroom. Old carpet and cloth furniture can hold a lot of animal dander. You may need to replace them. When should you call for help? Give an epinephrine shot if: 
· You think you are having a severe allergic reaction. · You have symptoms in more than one body area, such as mild nausea and an itchy mouth. After giving an epinephrine shot call 911, even if you feel better. Call 911 if: 
· You have symptoms of a severe allergic reaction. These may include: 
¨ Sudden raised, red areas (hives) all over your body. ¨ Swelling of the throat, mouth, lips, or tongue. ¨ Trouble breathing. ¨ Passing out (losing consciousness). Or you may feel very lightheaded or suddenly feel weak, confused, or restless. · You have been given an epinephrine shot, even if you feel better. Call your doctor now or seek immediate medical care if: 
· You have symptoms of an allergic reaction, such as: ¨ A rash or hives (raised, red areas on the skin). ¨ Itching. ¨ Swelling. ¨ Belly pain, nausea, or vomiting. Watch closely for changes in your health, and be sure to contact your doctor if: 
· You do not get better as expected. Where can you learn more? Go to http://preston-gilbert.info/. Enter T571 in the search box to learn more about \"Allergies: Care Instructions. \" 
 Current as of: February 12, 2016 Content Version: 11.1 © 3492-3570 Visuu. Care instructions adapted under license by Clupedia (which disclaims liability or warranty for this information). If you have questions about a medical condition or this instruction, always ask your healthcare professional. [address] any warranty or liability for your use of this information. Managing Your Allergies: Care Instructions Your Care Instructions Managing your allergies is an important part of staying healthy. Your doctor will help you find out what may be causing the allergies. Common causes of allergy symptoms are house dust and dust mites, animal dander, mold, and pollen. As soon as you know what triggers your symptoms, try to reduce your exposure to your triggers. This can help prevent allergy symptoms, asthma, and other health problems. Ask your doctor about allergy medicine or immunotherapy. These treatments may help reduce or prevent allergy symptoms. Follow-up care is a key part of your treatment and safety. Be sure to make and go to all appointments, and call your doctor if you are having problems. It's also a good idea to know your test results and keep a list of the medicines you take. How can you care for yourself at home? · If you think that dust or dust mites are causing your allergies: 
¨ Wash sheets, pillowcases, and other bedding every week in hot water. ¨ Use airtight, dust-proof covers for pillows, duvets, and mattresses. Avoid plastic covers, because they tend to tear quickly and do not \"breathe. \" Wash according to the instructions. ¨ Remove extra blankets and pillows that you don't need. ¨ Use blankets that are machine-washable. ¨ Don't use home humidifiers. They can help mites live longer. · Use air-conditioning. Change or clean all filters every month. Keep windows closed. Use high-efficiency air filters.  Don't use window or attic fans, which draw dust into the air. · If you're allergic to pet dander, keep pets outside or, at the very least, out of your bedroom. Old carpet and cloth-covered furniture can hold a lot of animal dander. You may need to replace them. · Look for signs of cockroaches. Use cockroach baits to get rid of them. Then clean your home well. · If you're allergic to mold, don't keep indoor plants, because molds can grow in soil. Get rid of furniture, rugs, and drapes that smell musty. Check for mold in the bathroom. · If you're allergic to pollen, stay inside when pollen counts are high. · Don't smoke or let anyone else smoke in your house. Don't use fireplaces or wood-burning stoves. Avoid paint fumes, perfumes, and other strong odors. When should you call for help? Give an epinephrine shot if: 
· You think you are having a severe allergic reaction. · You have symptoms in more than one body area, such as mild nausea and an itchy mouth. After giving an epinephrine shot call 911, even if you feel better. Call 911 if: 
· You have symptoms of a severe allergic reaction. These may include: 
¨ Sudden raised, red areas (hives) all over your body. ¨ Swelling of the throat, mouth, lips, or tongue. ¨ Trouble breathing. ¨ Passing out (losing consciousness). Or you may feel very lightheaded or suddenly feel weak, confused, or restless. · You have been given an epinephrine shot, even if you feel better. Call your doctor now or seek immediate medical care if: 
· You have symptoms of an allergic reaction, such as: ¨ A rash or hives (raised, red areas on the skin). ¨ Itching. ¨ Swelling. ¨ Belly pain, nausea, or vomiting. Watch closely for changes in your health, and be sure to contact your doctor if: 
· Your allergies get worse. · You need help controlling your allergies. · You have questions about allergy testing. · You do not get better as expected. Where can you learn more? Go to http://preston-gilbert.info/. Enter L249 in the search box to learn more about \"Managing Your Allergies: Care Instructions. \" Current as of: February 12, 2016 Content Version: 11.1 © 5181-2307 BlockAvenue. Care instructions adapted under license by BeliefNetworks (which disclaims liability or warranty for this information). If you have questions about a medical condition or this instruction, always ask your healthcare professional. [facility] [address] any warranty or liability for your use of this information. Using a Nasal Steroid Spray: Care Instructions Your Care Instructions Your doctor may suggest using a corticosteroid nasal spray for your allergy symptoms or sinus problems. These sprays reduce the swelling inside the nose and sinuses. Unlike decongestant nasal sprays, steroid sprays won't lead to more swelling when you stop taking them. These sprays start working in a few days, but it may take several weeks before you get the full effect. Most side effects are minor. The most common complaint is a burning feeling in the nose right after the spray is used. Some people get nosebleeds. Follow-up care is a key part of your treatment and safety. Be sure to make and go to all appointments, and call your doctor if you are having problems. It's also a good idea to know your test results and keep a list of the medicines you take. How can you care for yourself at home? Here are some tips for using these sprays: 
· You may need to prime the sprayer before you use it. This means spraying it into the air a few times to make sure you get the right amount of medicine. Follow the directions on the label. · Blow your nose before you spray. This will help clear out your nostrils. · Gently sniff the medicine into your nose as you spray. Don't snort, or the medicine will go all the way into your throat where it won't do much good. · Aim the nozzle straight toward the back of your nose. This will help keep the medicine from irritating the inside walls of your nostril, especially your septum (the wall that separates your left and right nostrils). · Don't blow your nose for 10 minutes or so after you spray. And try not to sneeze. · Be safe with medicines. Use this medicine exactly as prescribed. Call your doctor if you think you are having a problem with your medicine. · Clean your sprayer once a week. Read the label to learn how. When should you call for help? Call your doctor now or seek immediate medical care if: 
· You don't understand how to use the medicine. · Your symptoms aren't getting better as expected. · You think you are having a side effect from the medicine. Where can you learn more? Go to http://preston-gilbert.info/. Enter B932 in the search box to learn more about \"Using a Nasal Steroid Spray: Care Instructions. \" Current as of: July 29, 2016 Content Version: 11.1 © 6724-6977 Contactually. Care instructions adapted under license by [employer] (which disclaims liability or warranty for this information). If you have questions about a medical condition or this instruction, always ask your healthcare professional. [address] any warranty or liability for your use of this information. Introducing [facility] HE[facility] SERVICES! Anabel Guthrie introduces enrich-in patient portal. Now you can access parts of your medical record, email your doctor's office, and request medication refills online. 1. In your internet browser, go to [email or web address] 2. Click on the First Time User? Click Here link in the Sign In box. You will see the New Member Sign Up page. 3. Enter your enrich-in Access Code exactly as it appears below. You will not need to use this code after youve completed the sign-up process.  If you do not sign up before the expiration date, you must request a new code. · Fashion Republic Access Code: [email or web address] Expires: 4/13/2017  9:06 AM 
 
4. Enter the last four digits of your Social Security Number (xxxx) and Date of Birth (mm/dd/yyyy) as indicated and click Submit. You will be taken to the next sign-up page. 5. Create a Fashion Republic ID. This will be your Fashion Republic login ID and cannot be changed, so think of one that is secure and easy to remember. 6. Create a Fashion Republic password. You can change your password at any time. 7. Enter your Password Reset Question and Answer. This can be used at a later time if you forget your password. 8. Enter your e-mail address. You will receive e-mail notification when new information is available in [address] 9. Click Sign Up. You can now view and download portions of your medical record. 10. Click the Download Summary menu link to download a portable copy of your medical information. If you have questions, please visit the Frequently Asked Questions section of the Fashion Republic website. Remember, Fashion Republic is NOT to be used for urgent needs. For medical emergencies, dial 911. Now available from your iPhone and Android! Please provide this summary of care documentation to your next provider. If you have any questions after today's visit, please call [phone].

## 2017-03-24 ENCOUNTER — OFFICE VISIT (OUTPATIENT)
Dept: FAMILY MEDICINE CLINIC | Age: 32
End: 2017-03-24

## 2017-03-24 VITALS
OXYGEN SATURATION: 98 % | RESPIRATION RATE: 16 BRPM | BODY MASS INDEX: 21.05 KG/M2 | SYSTOLIC BLOOD PRESSURE: 121 MMHG | HEART RATE: 80 BPM | HEIGHT: 66 IN | TEMPERATURE: 97 F | WEIGHT: 131 LBS | DIASTOLIC BLOOD PRESSURE: 88 MMHG

## 2017-03-24 DIAGNOSIS — B20 HIV (HUMAN IMMUNODEFICIENCY VIRUS INFECTION) (HCC): ICD-10-CM

## 2017-03-24 DIAGNOSIS — Z23 ENCOUNTER FOR IMMUNIZATION: ICD-10-CM

## 2017-03-24 DIAGNOSIS — F41.9 ANXIETY: Primary | ICD-10-CM

## 2017-03-24 DIAGNOSIS — F32.A DEPRESSION, UNSPECIFIED DEPRESSION TYPE: ICD-10-CM

## 2017-03-24 RX ORDER — HYDROXYZINE 50 MG/1
50 TABLET, FILM COATED ORAL
COMMUNITY
End: 2017-03-24 | Stop reason: SDUPTHER

## 2017-03-24 RX ORDER — SERTRALINE HYDROCHLORIDE 100 MG/1
100 TABLET, FILM COATED ORAL DAILY
Qty: 30 TAB | Refills: 2 | Status: SHIPPED | OUTPATIENT
Start: 2017-03-24 | End: 2018-02-08 | Stop reason: ALTCHOICE

## 2017-03-24 RX ORDER — SERTRALINE HYDROCHLORIDE 100 MG/1
TABLET, FILM COATED ORAL DAILY
COMMUNITY
End: 2017-03-24 | Stop reason: SDUPTHER

## 2017-03-24 RX ORDER — HYDROXYZINE 50 MG/1
50 TABLET, FILM COATED ORAL
Qty: 30 TAB | Refills: 2 | Status: SHIPPED | OUTPATIENT
Start: 2017-03-24 | End: 2018-02-08

## 2017-03-24 NOTE — MR AVS SNAPSHOT
Visit Information Date & Time Provider Department Dept. Phone Encounter #  
 3/24/2017  4:00 PM Wiblert Marques PA-C [facility] [phone] [id number] Follow-up Instructions Return in about 4 weeks (around 4/21/2017) for CPE. Upcoming Health Maintenance Date Due Pneumococcal 19-64 Medium Risk (1 of 1 - PPSV23) 1/1/2003 DTaP/Tdap/Td series (1 - Tdap) 1/1/2005 INFLUENZA AGE 9 TO ADULT 8/1/2016 Allergies as of 3/24/2017  Review Complete On: 3/24/2017 By: Wilbert Marques PA-C Severity Noted Reaction Type Reactions Atripla [Efavirenz-emtricitabin-tenofov]  01/13/2017    Nausea and Vomiting With hallucinations Fluoride Susy [Sodium Fluoride]  01/13/2017    Other (comments) Sore throat Current Immunizations  Never Reviewed Name Date Influenza Vaccine (Quad) PF  Incomplete Not reviewed this visit You Were Diagnosed With   
  
 Codes Comments Anxiety    -  Primary ICD-10-CM: F41.9 ICD-9-CM: 300.00 Depression, unspecified depression type     ICD-10-CM: F32.9 ICD-9-CM: 697 HIV (human immunodeficiency virus infection) ([facility].)     ICD-10-CM: R50 ICD-9-CM: V08 Encounter for immunization     ICD-10-CM: M89 ICD-9-CM: V03.89 Vitals BP Pulse Temp Resp Height(growth percentile) Weight(growth percentile) 121/88 (BP 1 Location: Left arm, BP Patient Position: Sitting) 80 97 °F (36.1 °C) (Oral) 16 5' 6\" (1.676 m) 131 lb (59.4 kg) SpO2 BMI Smoking Status 98% 21.14 kg/m2 Current Some Day Smoker BMI and BSA Data Body Mass Index Body Surface Area  
 21.14 kg/m 2 1.66 m 2 Preferred Pharmacy Pharmacy Name Phone [facility] [address], [address] Your Updated Medication List  
  
   
This list is accurate as of: 3/24/17  4:17 PM.  Always use your most recent med list.  
  
  
  
  
 flunisolide 25 mcg (0.025 %) Spry Commonly known as:  NASAREL  
2 Sprays by Both Nostrils route two (2) times a day.  
  
 hydrOXYzine HCl 50 mg tablet Commonly known as:  ATARAX Take 1 Tab by mouth every six (6) hours as needed. levocetirizine 5 mg tablet Commonly known as:  Shelvia Edwar Take 1 Tab by mouth daily. PREZCOBIX PO Take 30 mg by mouth daily. Indications: HIV  
  
 sertraline 100 mg tablet Commonly known as:  ZOLOFT Take 1 Tab by mouth daily. Indications: ANXIETY WITH DEPRESSION  
  
 TIVICAY 10 mg Tab Generic drug:  dolutegravir Take  by mouth. Prescriptions Sent to Pharmacy Refills  
 hydrOXYzine HCl (ATARAX) 50 mg tablet 2 Sig: Take 1 Tab by mouth every six (6) hours as needed. Class: Normal  
 Pharmacy: [address] [place] [address] #: [phone] Route: Oral  
 sertraline (ZOLOFT) 100 mg tablet 2 Sig: Take 1 Tab by mouth daily. Indications: ANXIETY WITH DEPRESSION Class: Normal  
 Pharmacy: [address] [place] [address] #: [phone] Route: Oral  
  
We Performed the Following INFLUENZA VIRUS VAC QUAD,SPLIT,PRESV FREE SYRINGE 3/> YRS IM [id number] CPT(R)] REFERRAL TO INFECTIOUS DISEASE [REF37 Custom] Comments:  
 Please evaluate patient for HIV monitoring. REFERRAL TO PSYCHIATRY [REF91 Custom] Comments:  
 Please evaluate patient for anxiety with panic attacks, and depression. Follow-up Instructions Return in about 4 weeks (around 4/21/2017) for CPE. Referral Information Referral ID Referred By Referred To  
  
 [id number] [facility], [address] [place] [address] [address] Phone: [phone] Fax: [phone] Visits Status Start Date End Date 1 New Request 3/24/17 3/24/18  If your referral has a status of pending review or denied, additional information will be sent to support the outcome of this decision. Referral ID Referred By Referred To  
 [id number] LEANNA DAMON [facility] Infectious Diseases [address] [place] [address] Dr Eddy Héctor Camacho Phone: [phone] Fax: [phone] Visits Status Start Date End Date 1 New Request 3/24/17 3/24/18 If your referral has a status of pending review or denied, additional information will be sent to support the outcome of this decision. Patient Instructions Return in about a month for a full physical. 
 
In the meantime, check out this stuff:  
Try these quick mindfulness exercise. Setting aside a few minutes every day can help with stress and focus, Or you can do it when you're feeling stressed:  
 
https://Inventic. be/bKlTEPMdQ1d It can be found on [employer] under \"5-Minute Mindful Breathing Meditation\" Stop, Breathe & Think 
 
[email or web address].no \"Mindfulness Guided Meditation - 5 Minutes\" 
by Dr. Macy Mcclendon. [facility] Introducing [facility] & [facility]! [employer] introduces StackSafe patient portal. Now you can access parts of your medical record, email your doctor's office, and request medication refills online. 1. In your internet browser, go to [email or web address] 2. Click on the First Time User? Click Here link in the Sign In box. You will see the New Member Sign Up page. 3. Enter your StackSafe Access Code exactly as it appears below. You will not need to use this code after youve completed the sign-up process. If you do not sign up before the expiration date, you must request a new code. · StackSafe Access Code: [email or web address] Expires: 4/13/2017 10:06 AM 
 
4. Enter the last four digits of your Social Security Number (xxxx) and Date of Birth (mm/dd/yyyy) as indicated and click Submit. You will be taken to the next sign-up page. 5. Create a StackSafe ID.  This will be your StackSafe login ID and cannot be changed, so think of one that is secure and easy to remember. 6. Create a [employer] password. You can change your password at any time. 7. Enter your Password Reset Question and Answer. This can be used at a later time if you forget your password. 8. Enter your e-mail address. You will receive e-mail notification when new information is available in [address] 9. Click Sign Up. You can now view and download portions of your medical record. 10. Click the Download Summary menu link to download a portable copy of your medical information. If you have questions, please visit the Frequently Asked Questions section of the [employer] website. Remember, [employer] is NOT to be used for urgent needs. For medical emergencies, dial 911. Now available from your iPhone and Android! Please provide this summary of care documentation to your next provider. If you have any questions after today's visit, please call [phone].

## 2017-03-24 NOTE — LETTER
3/24/2017 4:17 PM 
 
Mr. Felix Mcdonald 
[address] [facility] [address] [facility] [address] [phone] To Whom It May Concern: 
 
Felix Mcdonald is currently under the care of [address]. He has established care with our office, is seeking care for mental health concerns, and has been referred accordingly. If there are questions or concerns please have the patient contact our office. Sincerely, Yasmeen Gonzalez PA-C

## 2017-03-24 NOTE — PROGRESS NOTES
HISTORY OF PRESENT ILLNESS  Ruiz Gomez is a 34 y.o. male. HPI Comments: Pt presents to establish care and for medication refills. He is working on anxiety and depression--admits to the occasional panic attack. 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 He is interested in a referrals to psychiatry for his anxiety, and to infectious disease for management of his HIV. Review of Systems   Constitutional: Negative for chills, fever and malaise/fatigue. HENT: Negative for congestion, ear pain and tinnitus. Eyes: Negative for blurred vision and double vision. Respiratory: Negative for shortness of breath. Cardiovascular: Negative for chest pain and palpitations. Gastrointestinal: Negative for diarrhea, nausea and vomiting. Genitourinary: Negative for dysuria. Musculoskeletal: Negative for joint pain and myalgias. Skin: Negative for rash. Neurological: Negative for dizziness, tingling and weakness. Psychiatric/Behavioral: Positive for depression. The patient is nervous/anxious. Visit Vitals    /88 (BP 1 Location: Left arm, BP Patient Position: Sitting)    Pulse 80    Temp 97 °F (36.1 °C) (Oral)    Resp 16    Ht 5' 6\" (1.676 m)    Wt 131 lb (59.4 kg)    SpO2 98%    BMI 21.14 kg/m2       Physical Exam   Constitutional: He is oriented to person, place, and time. Vital signs are normal. He appears well-developed and well-nourished. He is cooperative. He does not appear ill. No distress. HENT:   Head: Normocephalic and atraumatic. Right Ear: Tympanic membrane, external ear and ear canal normal.   Left Ear: Tympanic membrane, external ear and ear canal normal.   Nose: Nose normal. No mucosal edema or rhinorrhea. Mouth/Throat: Uvula is midline, oropharynx is clear and moist and mucous membranes are normal.   Eyes: Conjunctivae are normal.   Neck: Neck supple. Cardiovascular: Normal rate, regular rhythm and normal heart sounds.   Exam reveals no gallop and no friction rub. No murmur heard. Pulses:       Radial pulses are 2+ on the right side, and 2+ on the left side. Pulmonary/Chest: Effort normal and breath sounds normal. No respiratory distress. He has no decreased breath sounds. He has no wheezes. He has no rhonchi. He has no rales. Abdominal: Soft. There is no tenderness. Lymphadenopathy:     He has no cervical adenopathy. Neurological: He is alert and oriented to person, place, and time. Skin: Skin is warm and dry. He is not diaphoretic. Psychiatric: He has a normal mood and affect. His speech is normal and behavior is normal. Thought content normal.   Nursing note and vitals reviewed. ASSESSMENT and PLAN    ICD-10-CM ICD-9-CM    1. Anxiety F41.9 300.00 hydrOXYzine HCl (ATARAX) 50 mg tablet      sertraline (ZOLOFT) 100 mg tablet      REFERRAL TO PSYCHIATRY   2. Depression, unspecified depression type F32.9 311 sertraline (ZOLOFT) 100 mg tablet      REFERRAL TO PSYCHIATRY   3. HIV [facility] immunodeficiency virus infection) [facility] Z21 V08 REFERRAL TO INFECTIOUS DISEASE   4. Encounter for immunization Z23 V03.89 INFLUENZA VIRUS VAC QUAD,SPLIT,PRESV FREE SYRINGE 3/> YRS IM    Provided additional information on mindfulness exercises and recommended a daily mindfulness practice. Return in about a month for CPE. Pt verbalized understanding and agreement with the plan of care.     Sagar Garcia PA-C

## 2017-03-24 NOTE — PROGRESS NOTES
Administered 0.5 mL of influenza immunizations in right deltoid . Patient tolerated well with no signs of a reaction. Patient was given VIS.

## 2017-03-24 NOTE — PATIENT INSTRUCTIONS
Return in about a month for a full physical.    In the meantime, check out this stuff:   Try these quick mindfulness exercise. Setting aside a few minutes every day can help with stress and focus,  Or you can do it when you're feeling stressed:     [email or web address] [email or web address]  It can be found on YouTube under   \"5-Minute Mindful Breathing Meditation\"  Stop, Breathe & Think    [email or web address].no  \"Mindfulness Guided Meditation - 5 Minutes\"  by Dr. Monetta Bence.  Alva Rico

## 2017-03-24 NOTE — PROGRESS NOTES
Patient presents to the clinic for medication refill. Patient would also like to establish care. Requested Prescriptions     Pending Prescriptions Disp Refills    hydrOXYzine HCl (ATARAX) 50 mg tablet       Sig: Take 1 Tab by mouth every six (6) hours as needed.  sertraline (ZOLOFT) 100 mg tablet       Sig: Take  by mouth daily.

## 2017-04-25 ENCOUNTER — OFFICE VISIT (OUTPATIENT)
Dept: FAMILY MEDICINE CLINIC | Age: 32
End: 2017-04-25

## 2017-04-25 VITALS
OXYGEN SATURATION: 99 % | DIASTOLIC BLOOD PRESSURE: 85 MMHG | BODY MASS INDEX: 23.3 KG/M2 | WEIGHT: 145 LBS | SYSTOLIC BLOOD PRESSURE: 124 MMHG | TEMPERATURE: 97.9 F | HEIGHT: 66 IN | HEART RATE: 83 BPM | RESPIRATION RATE: 16 BRPM

## 2017-04-25 DIAGNOSIS — Z00.00 ROUTINE GENERAL MEDICAL EXAMINATION AT HEALTH CARE FACILITY: Primary | ICD-10-CM

## 2017-04-25 LAB
BILIRUB UR QL STRIP: NEGATIVE
GLUCOSE UR-MCNC: NEGATIVE MG/DL
KETONES P FAST UR STRIP-MCNC: NEGATIVE MG/DL
PH UR STRIP: 6.5 [PH] (ref 4.6–8)
PROT UR QL STRIP: NEGATIVE MG/DL
SP GR UR STRIP: 1.02 (ref 1–1.03)
UA UROBILINOGEN AMB POC: NORMAL (ref 0.2–1)
URINALYSIS CLARITY POC: CLEAR
URINALYSIS COLOR POC: YELLOW
URINE BLOOD POC: NEGATIVE
URINE LEUKOCYTES POC: NEGATIVE
URINE NITRITES POC: NEGATIVE

## 2017-04-25 NOTE — PATIENT INSTRUCTIONS
Keep eating your vegetables. Minimize intake of processed food, refined sugar, refined grains. Exercise regularly. We'll follow your labs and let you know in a few days if there are any issues. You can also check them out on MyChart.

## 2017-04-25 NOTE — MR AVS SNAPSHOT
Visit Information Date & Time Provider Department Dept. Phone Encounter #  
 4/25/2017  3:30 PM Nahed Chung PA-C [place] [phone] [id number] Follow-up Instructions Return in about 3 months (around 7/25/2017). Upcoming Health Maintenance Date Due Pneumococcal 19-64 Highest Risk (1 of 3 - PCV13) 1/1/2003 DTaP/Tdap/Td series (1 - Tdap) 1/1/2005 Allergies as of 4/25/2017  Review Complete On: 4/25/2017 By: Mili Pinedo LPN Severity Noted Reaction Type Reactions Atripla [Efavirenz-emtricitabin-tenofov]  01/13/2017    Nausea and Vomiting With hallucinations Fluoride Susy [Sodium Fluoride]  01/13/2017    Other (comments) Sore throat Current Immunizations  Never Reviewed Name Date Influenza Vaccine (Quad) PF 3/24/2017 Not reviewed this visit You Were Diagnosed With   
  
 Codes Comments Routine general medical examination at health care facility    -  Primary ICD-10-CM: Z00.00 ICD-9-CM: V70.0 Vitals BP Pulse Temp Resp Height(growth percentile) Weight(growth percentile) 124/85 (BP 1 Location: Right arm, BP Patient Position: Sitting) 83 97.9 °F (36.6 °C) (Oral) 16 5' 6\" (1.676 m) 145 lb (65.8 kg) SpO2 BMI Smoking Status 99% 23.4 kg/m2 Current Some Day Smoker BMI and BSA Data Body Mass Index Body Surface Area  
 23.4 kg/m 2 1.75 m 2 Preferred Pharmacy Pharmacy Name Phone [facility] [address] Your Updated Medication List  
  
   
This list is accurate as of: 4/25/17  4:37 PM.  Always use your most recent med list.  
  
  
  
  
 flunisolide 25 mcg (0.025 %) Spry Commonly known as:  NASAREL  
2 Sprays by Both Nostrils route two (2) times a day.  
  
 hydrOXYzine HCl 50 mg tablet Commonly known as:  ATARAX Take 1 Tab by mouth every six (6) hours as needed. levocetirizine 5 mg tablet Commonly known as:  Gaynel Ink Take 1 Tab by mouth daily. PREZCOBIX PO Take 30 mg by mouth daily. Indications: HIV  
  
 sertraline 100 mg tablet Commonly known as:  ZOLOFT Take 1 Tab by mouth daily. Indications: ANXIETY WITH DEPRESSION  
  
 TIVICAY 10 mg Tab Generic drug:  dolutegravir Take  by mouth. We Performed the Following AMB POC URINALYSIS DIP STICK AUTO W/O MICRO [80761 CPT(R)] CBC WITH AUTOMATED DIFF [88100 CPT(R)] LIPID PANEL [17292 CPT(R)] METABOLIC PANEL, COMPREHENSIVE [93235 CPT(R)] THYROID CASCADE PROFILE [TMC20541 Custom] VITAMIN D, 25 HYDROXY [id number] CPT(R)] Follow-up Instructions Return in about 3 months (around 7/25/2017). To-Do List   
 04/25/2017 Lab:  CBC WITH AUTOMATED DIFF   
  
 04/25/2017 Lab:  LIPID PANEL   
  
 04/25/2017 Lab:  METABOLIC PANEL, COMPREHENSIVE   
  
 04/25/2017 Lab:  THYROID CASCADE PROFILE   
  
 04/25/2017 Lab:  VITAMIN D, 25 HYDROXY Patient Instructions Keep eating your vegetables. Minimize intake of processed food, refined sugar, refined grains. Exercise regularly. We'll follow your labs and let you know in a few days if there are any issues. You can also check them out on [employer]. Introducing [facility]! Juan Hannah introduces [employer] patient portal. Now you can access parts of your medical record, email your doctor's office, and request medication refills online. 1. In your internet browser, go to [email or web address] 2. Click on the First Time User? Click Here link in the Sign In box. You will see the New Member Sign Up page. 3. Enter your [employer] Access Code exactly as it appears below. You will not need to use this code after youve completed the sign-up process. If you do not sign up before the expiration date, you must request a new code. · [employer] Access Code: [email or web address] Expires: 7/24/2017  4:37 PM 
 
 4. Enter the last four digits of your Social Security Number (xxxx) and Date of Birth (mm/dd/yyyy) as indicated and click Submit. You will be taken to the next sign-up page. 5. Create a [employer] ID. This will be your [employer] login ID and cannot be changed, so think of one that is secure and easy to remember. 6. Create a [employer] password. You can change your password at any time. 7. Enter your Password Reset Question and Answer. This can be used at a later time if you forget your password. 8. Enter your e-mail address. You will receive e-mail notification when new information is available in [address] 9. Click Sign Up. You can now view and download portions of your medical record. 10. Click the Download Summary menu link to download a portable copy of your medical information. If you have questions, please visit the Frequently Asked Questions section of the [employer] website. Remember, [employer] is NOT to be used for urgent needs. For medical emergencies, dial 911. Now available from your iPhone and Android! Please provide this summary of care documentation to your next provider. If you have any questions after today's visit, please call [phone].

## 2017-04-25 NOTE — PROGRESS NOTES
Patient presents to the clinic for a complete physical. Patient would also like to discuss a cough that began a few weeks ago.

## 2017-04-25 NOTE — PROGRESS NOTES
HISTORY OF PRESENT ILLNESS  Carol Harkins is a 34 y.o. male. HPI Comments: Pt presents for a complete physical.     He has some nasal congestion/drainage and cough productive of clear mucus. Denies fever, chills, malaise, or body aches. He admits to seasonal allergies for which he takes levocetirizine. He also has a wart on his right wrist, for which he has been applying lavender/eucalyptus/tea tree oil, which has shrunken it some, but hasn't resolved yet. He has a history of hairline fractures in T10 and T11, and has been doing low back and abdominal exercises that causes some seizing of the back. He is planning to utilize a  there works with these kinds of injuries. States he went to [facility] on March 30th (to infectious disease) and had his CD4 counts checked, and everything was normal. He has a follow up scheduled for June 5th. Complete Physical   Pertinent negatives include no chest pain, no headaches and no shortness of breath. Review of Systems   Constitutional: Negative for chills, fever and malaise/fatigue. HENT: Positive for congestion. Negative for ear pain, sore throat and tinnitus. Eyes: Negative for blurred vision and double vision. Respiratory: Positive for cough and sputum production. Negative for shortness of breath. Cardiovascular: Negative for chest pain and palpitations. Gastrointestinal: Negative for constipation, diarrhea, nausea and vomiting. Genitourinary: Negative for dysuria and hematuria. Musculoskeletal: Positive for back pain. Negative for joint pain and myalgias. Skin: Negative for rash. Neurological: Negative for dizziness, tingling, weakness and headaches. Psychiatric/Behavioral: The patient is nervous/anxious. The patient does not have insomnia.       Visit Vitals    /85 (BP 1 Location: Right arm, BP Patient Position: Sitting)    Pulse 83    Temp 97.9 °F (36.6 °C) (Oral)    Resp 16    Ht 5' 6\" (1.676 m)    Wt 145 lb (65.8 kg)    SpO2 99%    BMI 23.4 kg/m2       Physical Exam   Constitutional: He is oriented to person, place, and time. Vital signs are normal. He appears well-developed and well-nourished. He is cooperative. He does not appear ill. No distress. HENT:   Head: Normocephalic and atraumatic. Right Ear: Tympanic membrane, external ear and ear canal normal.   Left Ear: Tympanic membrane, external ear and ear canal normal.   Nose: Nose normal.   Mouth/Throat: Uvula is midline, oropharynx is clear and moist and mucous membranes are normal.   Sniffling throughout exam   Eyes: Conjunctivae and EOM are normal. Pupils are equal, round, and reactive to light. Neck: Neck supple. Cardiovascular: Normal rate, regular rhythm and normal heart sounds. Exam reveals no gallop and no friction rub. No murmur heard. Pulses:       Radial pulses are 2+ on the right side, and 2+ on the left side. Pulmonary/Chest: Effort normal. No respiratory distress. He has no decreased breath sounds. He has wheezes in the right upper field. He has no rhonchi. He has no rales. Abdominal: Soft. Normal appearance and bowel sounds are normal. There is no hepatosplenomegaly. There is no tenderness. Hernia confirmed negative in the right inguinal area and confirmed negative in the left inguinal area. Genitourinary: Testes normal and penis normal. Right testis shows no mass, no swelling and no tenderness. Right testis is descended. Left testis shows no mass, no swelling and no tenderness. Left testis is descended. Uncircumcised. Musculoskeletal: He exhibits no edema, tenderness or deformity. Lymphadenopathy:     He has no cervical adenopathy. Neurological: He is alert and oriented to person, place, and time. No cranial nerve deficit. Reflex Scores:       Patellar reflexes are 2+ on the right side and 2+ on the left side.  strength normal and symmetric    CN II-XII grossly intact   Skin: Skin is warm and dry. No rash noted.  He is not diaphoretic. Small wart (~ 1 cm) noted on right wrist near snuff box   Psychiatric: He has a normal mood and affect. His speech is normal and behavior is normal. Thought content normal.   Nursing note and vitals reviewed. ASSESSMENT and PLAN    ICD-10-CM ICD-9-CM    1. Routine general medical examination at [facility] facility Z00.00 V70.0 AMB POC URINALYSIS DIP STICK AUTO W/O MICRO      CBC WITH AUTOMATED DIFF      METABOLIC PANEL, COMPREHENSIVE      THYROID CASCADE PROFILE      VITAMIN D, 25 HYDROXY      LIPID PANEL      CBC WITH AUTOMATED DIFF      METABOLIC PANEL, COMPREHENSIVE      THYROID CASCADE PROFILE      VITAMIN D, 25 HYDROXY      LIPID PANEL     Will follow labs and advise. Cough seems related to allergy symptoms. Pt currently taking guaifenesin syrup. Recommended adding OTC dextromethorphan. Benign wart noted on right wrist. Discussed alternative treatments including OTC liquid nitrogen and duct tape. Pt verbalized understanding and agreement with the plan of care.     Josephine Luna PA-C

## 2017-04-26 LAB
25(OH)D3 SERPL-MCNC: 16.7 NG/ML (ref 32–100)
A-G RATIO,AGRAT: 1.9 RATIO (ref 1.1–2.6)
ABSOLUTE LYMPHOCYTE COUNT, 10803: 1.2 K/UL (ref 1–4.8)
ALBUMIN SERPL-MCNC: 4.5 G/DL (ref 3.5–5)
ALP SERPL-CCNC: 77 U/L (ref 25–115)
ALT SERPL-CCNC: 31 U/L (ref 5–40)
ANION GAP SERPL CALC-SCNC: 20 MMOL/L
AST SERPL W P-5'-P-CCNC: 29 U/L (ref 10–37)
BASOPHILS # BLD: 0 K/UL (ref 0–0.2)
BASOPHILS NFR BLD: 0 % (ref 0–2)
BILIRUB SERPL-MCNC: 0.5 MG/DL (ref 0.2–1.2)
BUN SERPL-MCNC: 15 MG/DL (ref 6–22)
CALCIUM SERPL-MCNC: 9.1 MG/DL (ref 8.4–10.4)
CHLORIDE SERPL-SCNC: 97 MMOL/L (ref 98–110)
CHOLEST SERPL-MCNC: 173 MG/DL (ref 110–200)
CO2 SERPL-SCNC: 26 MMOL/L (ref 20–32)
CREAT SERPL-MCNC: 1 MG/DL (ref 0.5–1.2)
EOSINOPHIL # BLD: 0 K/UL (ref 0–0.5)
EOSINOPHIL NFR BLD: 1 % (ref 0–6)
ERYTHROCYTE [DISTWIDTH] IN BLOOD BY AUTOMATED COUNT: 13.8 % (ref 10–16)
GFRAA, 66117: >60
GFRNA, 66118: >60
GLOBULIN,GLOB: 2.4 G/DL (ref 2–4)
GLUCOSE SERPL-MCNC: 86 MG/DL (ref 65–99)
GRANULOCYTES,GRANS: 62 % (ref 40–75)
HCT VFR BLD AUTO: 48 % (ref 36.6–51.9)
HDLC SERPL-MCNC: 51 MG/DL (ref 40–59)
HGB BLD-MCNC: 15.5 G/DL (ref 13.2–17.3)
LDLC SERPL CALC-MCNC: 91 MG/DL (ref 50–99)
LYMPHOCYTES, LYMLT: 25 % (ref 27–45)
MCH RBC QN AUTO: 33 PG (ref 26–34)
MCHC RBC AUTO-ENTMCNC: 32 G/DL (ref 32–36)
MCV RBC AUTO: 103 FL (ref 80–95)
MONOCYTES # BLD: 0.6 K/UL (ref 0.1–0.9)
MONOCYTES NFR BLD: 12 % (ref 3–9)
NEUTROPHILS # BLD AUTO: 3.1 K/UL (ref 1.8–7.7)
PLATELET # BLD AUTO: 219 K/UL (ref 140–440)
PMV BLD AUTO: 9.6 FL (ref 6–10.8)
POTASSIUM SERPL-SCNC: 4.6 MMOL/L (ref 3.5–5.5)
PROT SERPL-MCNC: 6.9 G/DL (ref 6.4–8.3)
RBC # BLD AUTO: 4.66 M/UL (ref 3.8–5.8)
SODIUM SERPL-SCNC: 143 MMOL/L (ref 133–145)
TRIGL SERPL-MCNC: 154 MG/DL (ref 40–149)
TSH SERPL-ACNC: 1.28 MCU/ML (ref 0.27–4.2)
VLDLC SERPL CALC-MCNC: 31 MG/DL (ref 8–30)
WBC # BLD AUTO: 4.9 K/UL (ref 4–11)

## 2017-05-01 ENCOUNTER — TELEPHONE (OUTPATIENT)
Dept: FAMILY MEDICINE CLINIC | Age: 32
End: 2017-05-01

## 2017-05-01 DIAGNOSIS — E55.9 HYPOVITAMINOSIS D: Primary | ICD-10-CM

## 2017-05-01 PROBLEM — F41.9 ANXIETY: Status: ACTIVE | Noted: 2017-05-01

## 2017-05-01 PROBLEM — B20 HIV (HUMAN IMMUNODEFICIENCY VIRUS INFECTION) (HCC): Status: ACTIVE | Noted: 2017-05-01

## 2017-05-01 PROBLEM — J30.1 SEASONAL ALLERGIC RHINITIS DUE TO POLLEN: Status: ACTIVE | Noted: 2017-05-01

## 2017-05-01 RX ORDER — ASPIRIN 325 MG
1 TABLET, DELAYED RELEASE (ENTERIC COATED) ORAL
Qty: 12 CAP | Refills: 0 | Status: SHIPPED | OUTPATIENT
Start: 2017-05-01 | End: 2018-02-09 | Stop reason: SDUPTHER

## 2017-05-01 NOTE — TELEPHONE ENCOUNTER
Attempted to reach pt to discuss lab results. Phone number rings to reorder. Will send message via [address]

## 2017-05-01 NOTE — PROGRESS NOTES
Vitamin D: take 12 weeks replenishment supplement (once per week), follow up in  3 months for recheck. Triglycerides: get plenty of exercise, watch intake of sugar/flour, get plenty of soluble fiber (veg, fruit, nuts, seeds, legumes), increase omega-3 intake.

## 2017-06-15 PROBLEM — F10.20 ALCOHOLISM (HCC): Status: ACTIVE | Noted: 2017-06-15

## 2017-06-15 PROBLEM — F40.01 AGORAPHOBIA WITH PANIC DISORDER: Status: ACTIVE | Noted: 2017-06-15

## 2017-06-15 PROBLEM — F33.9 RECURRENT MAJOR DEPRESSION (HCC): Status: ACTIVE | Noted: 2017-06-15

## 2017-09-18 ENCOUNTER — OFFICE VISIT (OUTPATIENT)
Dept: INTERNAL MEDICINE CLINIC | Age: 32
End: 2017-09-18

## 2017-09-18 VITALS
SYSTOLIC BLOOD PRESSURE: 137 MMHG | TEMPERATURE: 97.3 F | DIASTOLIC BLOOD PRESSURE: 96 MMHG | OXYGEN SATURATION: 100 % | BODY MASS INDEX: 23.95 KG/M2 | HEART RATE: 74 BPM | WEIGHT: 149 LBS | HEIGHT: 66 IN | RESPIRATION RATE: 16 BRPM

## 2017-09-18 DIAGNOSIS — M25.562 ACUTE PAIN OF LEFT KNEE: ICD-10-CM
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
## 2017-09-18 DIAGNOSIS — R11.0 NAUSEA: ICD-10-CM

## 2017-09-18 DIAGNOSIS — T14.8XXA BRUISING: ICD-10-CM

## 2017-09-18 DIAGNOSIS — R10.11 RIGHT UPPER QUADRANT ABDOMINAL PAIN: ICD-10-CM

## 2017-09-18 RX ORDER — IBUPROFEN 800 MG/1
800 TABLET ORAL
Qty: 20 TAB | Refills: 0 | Status: SHIPPED | OUTPATIENT
Start: 2017-09-18

## 2017-09-18 NOTE — PROGRESS NOTES
ROOM # 9    Millie Freeman presents today for   Chief Complaint   Patient presents with    Abdominal Pain     s/p fall 2 days ago    Bleeding/Bruising       Millie Freeman preferred language for health care discussion is english/other. Is someone accompanying this pt? No    Is the patient using any DME equipment during OV? No    Depression Screening:  PHQ over the last two weeks 4/25/2017 3/24/2017 3/4/2017 1/31/2017   Little interest or pleasure in doing things Not at all Several days Not at all Not at all   Feeling down, depressed or hopeless Not at all More than half the days Not at all Not at all   Total Score PHQ 2 0 3 0 0       Learning Assessment:  Learning Assessment 3/24/2017 1/31/2017   PRIMARY LEARNER Patient Patient   HIGHEST LEVEL OF EDUCATION - PRIMARY LEARNER  2 [place] LEARNER NONE NONE   CO-LEARNER CAREGIVER No No   PRIMARY LANGUAGE ENGLISH ENGLISH    NEED - No   LEARNER PREFERENCE PRIMARY DEMONSTRATION DEMONSTRATION   LEARNING SPECIAL TOPICS - no   ANSWERED BY patient patient   RELATIONSHIP SELF SELF   ASSESSMENT COMMENT - none       Abuse Screening:  No    Fall Risk  Completed. Health Maintenance reviewed and discussed per provider. Yes    Please order/place referral if appropriate. Advance Directive:  1. Do you have an advance directive in place? Patient Reply: No    2. If not, would you like material regarding how to put one in place? Patient Reply: No    Coordination of Care:  1. Have you been to the ER, urgent care clinic since your last visit? Hospitalized since your last visit? No    2. Have you seen or consulted any other health care providers outside of the [address] [place] [address] since your last visit? Include any colon screening.  No

## 2017-09-18 NOTE — PATIENT INSTRUCTIONS
Bruises: Care Instructions  Your Care Instructions    Bruises occur when small blood vessels under the skin tear or rupture, most often from a twist, bump, or fall. Blood leaks into tissues under the skin and causes a black-and-blue spot that often turns colors, including purplish black, reddish blue, or yellowish green, as the bruise heals. Bruises hurt, but most are not serious and will go away on their own within 2 to 4 weeks. Sometimes, gravity causes them to spread down the body. A leg bruise usually will take longer to heal than a bruise on the face or arms. Follow-up care is a key part of your treatment and safety. Be sure to make and go to all appointments, and call your doctor if you are having problems. Its also a good idea to know your test results and keep a list of the medicines you take. How can you care for yourself at home? · Take pain medicines exactly as directed. ¨ If the doctor gave you a prescription medicine for pain, take it as prescribed. ¨ If you are not taking a prescription pain medicine, ask your doctor if you can take an over-the-counter medicine. · Put ice or a cold pack on the area for 10 to 20 minutes at a time. Put a thin cloth between the ice and your skin. · If you can, prop up the bruised area on pillows as much as possible for the next few days. Try to keep the bruise above the level of your heart. When should you call for help? Call your doctor now or seek immediate medical care if:  · You have signs of infection, such as:  ¨ Increased pain, swelling, warmth, or redness. ¨ Red streaks leading from the bruise. ¨ Pus draining from the bruise. ¨ A fever. · You have a bruise on your leg and signs of a blood clot, such as:  ¨ Increasing redness and swelling along with warmth, tenderness, and pain in the bruised area. ¨ Pain in your calf, back of the knee, thigh, or groin. ¨ Redness and swelling in your leg or groin. · Your pain gets worse.   Watch closely for changes in your health, and be sure to contact your doctor if:  · You do not get better as expected. Where can you learn more? Go to http://preston-gilbert.info/. Enter ([phone] 853-800 in the search box to learn more about \"Bruises: Care Instructions. \"  Current as of: March 20, 2017  Content Version: 11.3  © 5794-3375 [employer]. Care instructions adapted under license by [employer] (which disclaims liability or warranty for this information). If you have questions about a medical condition or this instruction, always ask your healthcare professional. [place] [address] any warranty or liability for your use of this information.

## 2017-09-18 NOTE — MR AVS SNAPSHOT
Visit Information Date & Time Provider Department Dept. Phone Encounter #  
 9/18/2017 11:45 AM Jaylen East NP [employer] [phone] [id number] Follow-up Instructions Return if symptoms worsen or fail to improve. Upcoming Health Maintenance Date Due Pneumococcal 19-64 Highest Risk (1 of 3 - PCV13) 1/1/2003 DTaP/Tdap/Td series (1 - Tdap) 1/1/2005 INFLUENZA AGE 9 TO ADULT 8/1/2017 Allergies as of 9/18/2017  Review Complete On: 9/18/2017 By: Cuate Prince LPN Severity Noted Reaction Type Reactions Atripla [Efavirenz-emtricitabin-tenofov]  01/13/2017    Nausea and Vomiting With hallucinations Fluoride Susy [Sodium Fluoride]  01/13/2017    Other (comments) Sore throat Current Immunizations  Never Reviewed Name Date Influenza Vaccine (Quad) PF 3/24/2017 Not reviewed this visit You Were Diagnosed With   
  
 Codes Comments Fall, initial encounter    -  Primary ICD-10-CM: W19. Iqra Jackeline ICD-9-CM: E888.9 Bruising     ICD-10-CM: T14.8 ICD-9-CM: 924.9 Right upper quadrant abdominal pain     ICD-10-CM: R10.11 ICD-9-CM: 789.01 Nausea     ICD-10-CM: R11.0 ICD-9-CM: 787.02 Acute pain of left knee     ICD-10-CM: M25.562 ICD-9-CM: 719.46 Vitals BP Pulse Temp Resp Height(growth percentile) Weight(growth percentile) (!) 137/96 (BP 1 Location: Right arm, BP Patient Position: Sitting) 74 97.3 °F (36.3 °C) (Oral) 16 5' 6\" (1.676 m) 149 lb (67.6 kg) SpO2 BMI Smoking Status 100% 24.05 kg/m2 Current Some Day Smoker Vitals History BMI and BSA Data Body Mass Index Body Surface Area 24.05 kg/m 2 1.77 m 2 Preferred Pharmacy Pharmacy Name Phone [facility], [address] Your Updated Medication List  
  
   
This list is accurate as of: 9/18/17 12:07 PM.  Always use your most recent med list.  
  
  
  
 Cholecalciferol (Vitamin D3) 50,000 unit Cap Take 1 Cap by mouth every seven (7) days. flunisolide 25 mcg (0.025 %) Spry Commonly known as:  NASAREL  
2 Sprays by Both Nostrils route two (2) times a day.  
  
 hydrOXYzine HCl 50 mg tablet Commonly known as:  ATARAX Take 1 Tab by mouth every six (6) hours as needed. ibuprofen 800 mg tablet Commonly known as:  MOTRIN Take 1 Tab by mouth every six (6) hours as needed for Pain.  
  
 levocetirizine 5 mg tablet Commonly known as:  David Ambrosia Take 1 Tab by mouth daily. PREZCOBIX PO Take 30 mg by mouth daily. Indications: HIV  
  
 sertraline 100 mg tablet Commonly known as:  ZOLOFT Take 1 Tab by mouth daily. Indications: ANXIETY WITH DEPRESSION  
  
 TIVICAY 10 mg Tab Generic drug:  dolutegravir Take  by mouth. Prescriptions Sent to Pharmacy Refills  
 ibuprofen (MOTRIN) 800 mg tablet 0 Sig: Take 1 Tab by mouth every six (6) hours as needed for Pain. Class: Normal  
 Pharmacy: [address] [place] [address] #: [phone] Route: Oral  
  
Follow-up Instructions Return if symptoms worsen or fail to improve. Patient Instructions Bruises: Care Instructions Your Care Instructions Bruises occur when small blood vessels under the skin tear or rupture, most often from a twist, bump, or fall. Blood leaks into tissues under the skin and causes a black-and-blue spot that often turns colors, including purplish black, reddish blue, or yellowish green, as the bruise heals. Bruises hurt, but most are not serious and will go away on their own within 2 to 4 weeks. Sometimes, gravity causes them to spread down the body. A leg bruise usually will take longer to heal than a bruise on the face or arms. Follow-up care is a key part of your treatment and safety.  Be sure to make and go to all appointments, and call your doctor if you are having problems. Its also a good idea to know your test results and keep a list of the medicines you take. How can you care for yourself at home? · Take pain medicines exactly as directed. ¨ If the doctor gave you a prescription medicine for pain, take it as prescribed. ¨ If you are not taking a prescription pain medicine, ask your doctor if you can take an over-the-counter medicine. · Put ice or a cold pack on the area for 10 to 20 minutes at a time. Put a thin cloth between the ice and your skin. · If you can, prop up the bruised area on pillows as much as possible for the next few days. Try to keep the bruise above the level of your heart. When should you call for help? Call your doctor now or seek immediate medical care if: 
· You have signs of infection, such as: 
¨ Increased pain, swelling, warmth, or redness. ¨ Red streaks leading from the bruise. ¨ Pus draining from the bruise. ¨ A fever. · You have a bruise on your leg and signs of a blood clot, such as: 
¨ Increasing redness and swelling along with warmth, tenderness, and pain in the bruised area. ¨ Pain in your calf, back of the knee, thigh, or groin. ¨ Redness and swelling in your leg or groin. · Your pain gets worse. Watch closely for changes in your health, and be sure to contact your doctor if: 
· You do not get better as expected. Where can you learn more? Go to http://preston-gilbert.info/. Enter [phone] in the search box to learn more about \"Bruises: Care Instructions. \" Current as of: March 20, 2017 Content Version: 11.3 © 0324-5199 Context app. Care instructions adapted under license by [employer] (which disclaims liability or warranty for this information). If you have questions about a medical condition or this instruction, always ask your healthcare professional. [place] [address] any warranty or liability for your use of this information. Introducing [facility]! Dear Michelle Luna: 
Thank you for requesting a Physcient account. Our records indicate that you already have an active Physcient account. You can access your account anytime at [email or web address] Did you know that you can access your hospital and ER discharge instructions at any time in Physcient? You can also review all of your test results from your hospital stay or ER visit. Additional Information If you have questions, please visit the Frequently Asked Questions section of the Physcient website at [email or web address] Remember, Physcient is NOT to be used for urgent needs. For medical emergencies, dial 911. Now available from your iPhone and Android! Please provide this summary of care documentation to your next provider. If you have any questions after today's visit, please call [phone].

## 2017-09-18 NOTE — PROGRESS NOTES
HISTORY OF PRESENT ILLNESS  Rehan Velez is a 34 y.o. male. Patient presents for a fall evaluation with bruising to left knee and right lower quad. States he felt \"quizzy today at [employer] but denies any nausea at this time. Patient states he was running on the asphalt two days ago,tripped and felt forward landing on his left knee and abdomen,states he went to work today and aggravated the pain. Pain to right lower abd exacerbated with movement. 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 Denies any pain at rest,rates pain as 7/10. Denies any head injury or LOC with the fall. Denies any back pain,dysuria, hematuria, bloody stools, vomiting diarrhea, bowel/bladder incontinence,fever,chills, SOb,CP. Patient declines any abd work up,declines Ct abd ,states he just wants \"something for pain and a day off work\". Patient hesitant about extensive work up like imaging at this time,explained to patient procedure was precautionary because of the proximity with appendix,patient states he will see his PCP or his infectious disease specialist \"because they accept my insurance\". Abdominal Pain   The history is provided by the patient. This is a new problem. The current episode started 2 days ago. The problem has been gradually worsening. Associated symptoms include abdominal pain. The symptoms are aggravated by bending. He has tried acetaminophen (States he took Advil PM with some relief) for the symptoms. The treatment provided mild relief. Bleeding/Bruising   The history is provided by the patient. This is a new problem. The problem has been gradually worsening. Associated symptoms include abdominal pain. Review of Systems   Constitutional: Negative for chills, diaphoresis, fever, malaise/fatigue and weight loss. HENT: Negative. Eyes: Negative. Respiratory: Negative. Cardiovascular: Negative. Gastrointestinal: Positive for abdominal pain. Tenderness to right lower quad with tennis ball size bruising to right lower quad slightly below umbilical line,no rebound tenderness or organomegaly, no distention, Bs present x 4. Neurological: Negative. Negative for weakness. Physical Exam   Constitutional: He is oriented to person, place, and time. He appears well-developed. HENT:   Head: Normocephalic. Eyes: Pupils are equal, round, and reactive to light. Neck: Normal range of motion. Cardiovascular: Normal rate. Pulmonary/Chest: Effort normal.   Abdominal: Soft. Musculoskeletal: He exhibits tenderness. Neurological: He is alert and oriented to person, place, and time. GCS eye subscore is 4. GCS verbal subscore is 5. GCS motor subscore is 6. Skin: Abrasion and bruising noted. There is erythema. Abrasion to left lateral knee at healing stage. Psychiatric: He has a normal mood and affect. His speech is normal and behavior is normal. Judgment and thought content normal. Cognition and memory are normal.       ASSESSMENT and PLAN    ICD-10-CM ICD-9-CM    1. Fall, initial encounter [address] XXXA E888.9    2. Bruising T14.8 924.9    3. Right upper quadrant abdominal pain R10.11 789.01 CANCELED: CT ABD W CONT   4. Nausea R11.0 787.02 CANCELED: CT ABD W CONT   5. Acute pain of left knee M25.562 719.46 ibuprofen (MOTRIN) 800 mg tablet     Encounter Diagnoses   Name Primary?  Fall, initial encounter Yes    Bruising     Right upper quadrant abdominal pain     Nausea     Acute pain of left knee      Orders Placed This Encounter    ibuprofen (MOTRIN) 800 mg tablet     Orders Placed This Encounter    ibuprofen (MOTRIN) 800 mg tablet     Sig: Take 1 Tab by mouth every six (6) hours as needed for Pain. Dispense:  20 Tab     Refill:  0     Orders Placed This Encounter    ibuprofen (MOTRIN) 800 mg tablet     Diagnoses and all orders for this visit:    1. Fall, initial encounter    2. Bruising    3.  Right upper quadrant abdominal pain    4. Nausea    5. Acute pain of left knee  -     ibuprofen (MOTRIN) 800 mg tablet; Take 1 Tab by mouth every six (6) hours as needed for Pain. Follow-up Disposition:  Return if symptoms worsen or fail to improve. current treatment plan is effective, no change in therapy  the following changes in treatment are made: Patient declined CT abd, NSAID for pain and f/u with PCP. Discussed internal organ pain vs abd wall pain to patient,verbalized understanding, advised to seek immediate emergent care with persisting/worsening right lower quad pain,verbalized understanding.

## 2017-09-18 NOTE — PROGRESS NOTES
Patient presents for a fall evaluation with bruising to left knee and right lower quad. Patient states he was running on the asphalt two days ago,tripped and felt forward landing on his left knee and abdomen,states he went to work today and aggravated the pain. Pain to right lower abd exacerbated with movement. 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 Denies any pain at rest,rates pain as 7/10. Denies any head injury or LOC with the fall. Denies any back pain,dysuria, hematuria, bloody stools, vomiting diarrhea, bowel/bladder incontinence,fever,chills, SOb,CP. Patient declines any abd work up,declines Ct abd ,states he just wants \"something for pain and a day off work\". Patient hesitant about extensive work up like imaging at this time,explained to patient procedure was precautionary because of the proximity with appendix,patient states he will see his PCP or his infectious disease specialist \"because they accept my insurance\".

## 2017-09-18 NOTE — LETTER
NOTIFICATION RETURN TO WORK / SCHOOL 
 
9/18/2017 12:05 PM 
 
Mr. Rehan Velez 
[address] [facility] [address]  [address] [facility] [place] [phone] To Whom It May Concern: 
 
Rehan Velez is currently under the care of Ritika Sandoval. He will return to work/school on: 09/19/2018 If there are questions or concerns please have the patient contact our office. Sincerely, Morena Shanks NP

## 2018-02-08 ENCOUNTER — OFFICE VISIT (OUTPATIENT)
Dept: FAMILY MEDICINE CLINIC | Age: 33
End: 2018-02-08

## 2018-02-08 VITALS
WEIGHT: 150 LBS | BODY MASS INDEX: 24.11 KG/M2 | RESPIRATION RATE: 18 BRPM | TEMPERATURE: 96 F | SYSTOLIC BLOOD PRESSURE: 137 MMHG | HEART RATE: 92 BPM | DIASTOLIC BLOOD PRESSURE: 92 MMHG | OXYGEN SATURATION: 100 % | HEIGHT: 66 IN

## 2018-02-08 DIAGNOSIS — B20 HIV (HUMAN IMMUNODEFICIENCY VIRUS INFECTION) (HCC): ICD-10-CM

## 2018-02-08 DIAGNOSIS — F29 PSYCHOSIS, UNSPECIFIED PSYCHOSIS TYPE (HCC): ICD-10-CM

## 2018-02-08 DIAGNOSIS — R03.0 BLOOD PRESSURE ELEVATED WITHOUT HISTORY OF HTN: ICD-10-CM

## 2018-02-08 DIAGNOSIS — F33.3 SEVERE EPISODE OF RECURRENT MAJOR DEPRESSIVE DISORDER, WITH PSYCHOTIC FEATURES (HCC): ICD-10-CM

## 2018-02-08 DIAGNOSIS — J30.9 CHRONIC ALLERGIC RHINITIS, UNSPECIFIED SEASONALITY, UNSPECIFIED TRIGGER: Primary | ICD-10-CM

## 2018-02-08 DIAGNOSIS — E55.9 HYPOVITAMINOSIS D: ICD-10-CM

## 2018-02-08 DIAGNOSIS — R21 RASH: ICD-10-CM

## 2018-02-08 DIAGNOSIS — K13.79 SORE IN MOUTH: ICD-10-CM

## 2018-02-08 DIAGNOSIS — F41.9 ANXIETY: ICD-10-CM

## 2018-02-08 DIAGNOSIS — F10.20 ALCOHOLISM (HCC): ICD-10-CM

## 2018-02-08 RX ORDER — LEVOCETIRIZINE DIHYDROCHLORIDE 5 MG/1
5 TABLET, FILM COATED ORAL DAILY
Qty: 30 TAB | Refills: 0 | Status: SHIPPED | OUTPATIENT
Start: 2018-02-08

## 2018-02-08 RX ORDER — SERTRALINE HYDROCHLORIDE 50 MG/1
50 TABLET, FILM COATED ORAL DAILY
Qty: 30 TAB | Refills: 1 | Status: SHIPPED | OUTPATIENT
Start: 2018-02-08 | End: 2018-05-23 | Stop reason: SDUPTHER

## 2018-02-08 NOTE — ASSESSMENT & PLAN NOTE
This condition is managed by Specialist. ([facility])  Key Infectious Disease Meds             DARUNAVIR/COBICISTAT (PREZCOBIX PO)  (Taking) Take 30 mg by mouth daily. Indications: HIV    dolutegravir (TIVICAY) 10 mg tab  (Taking) Take  by mouth.         Lab Results   Component Value Date/Time    WBC 4.9 04/25/2017 04:02 PM    HGB 15.5 04/25/2017 04:02 PM    HCT 48.0 04/25/2017 04:02 PM    PLATELET 553 31/32/5201 04:02 PM    Creatinine 1.0 04/25/2017 04:02 PM    BUN 15 04/25/2017 04:02 PM

## 2018-02-08 NOTE — PATIENT INSTRUCTIONS
Take Xyzal daily until nasal and skin symptoms resolve. If the Xyzal is not helping with the ithcy skin, use some OTC hydrocortisone on the affected areas. Moisturize daily, especially after showering. Monitor for signs of infection (see below). Continue to monitor the red area at the roof of your mouth. Return with worsening signs of infection. Rash: Care Instructions  Your Care Instructions  A rash is any irritation or inflammation of the skin. Rashes have many possible causes, including allergy, infection, illness, heat, and emotional stress. Follow-up care is a key part of your treatment and safety. Be sure to make and go to all appointments, and call your doctor if you are having problems. It's also a good idea to know your test results and keep a list of the medicines you take. How can you care for yourself at home? · Wash the area with water only. Soap can make dryness and itching worse. Pat dry. · Put cold, wet cloths on the rash to reduce itching. · Keep cool, and stay out of the sun. · Leave the rash open to the air as much of the time as possible. · Sometimes petroleum jelly (Vaseline) can help relieve the discomfort caused by a rash. A moisturizing lotion, such as Cetaphil, also may help. Calamine lotion may help for rashes caused by contact with something (such as a plant or soap) that irritated the skin. Use it 3 or 4 times a day. · If your doctor prescribed a cream, use it as directed. If your doctor prescribed medicine, take it exactly as directed. · If your rash itches so badly that it interferes with your normal activities, take an over-the-counter antihistamine, such as diphenhydramine (Benadryl) or loratadine (Claritin). Read and follow all instructions on the label. When should you call for help? Call your doctor now or seek immediate medical care if:  ? · You have signs of infection, such as:  ¨ Increased pain, swelling, warmth, or redness.   ¨ Red streaks leading from the area. ¨ Pus draining from the area. ¨ A fever. ? · You have joint pain along with the rash. ? Watch closely for changes in your health, and be sure to contact your doctor if:  ? · Your rash is changing or getting worse. For example, call if you have pain along with the rash, the rash is spreading, or you have new blisters. ? · You do not get better after 1 week. Where can you learn more? Go to http://preston-gilbert.info/. Enter E135 in the search box to learn more about \"Rash: Care Instructions. \"  Current as of: October 13, 2016  Content Version: 11.4  © 4565-2746 Voice123. Care instructions adapted under license by [employer] (which disclaims liability or warranty for this information). If you have questions about a medical condition or this instruction, always ask your healthcare professional. [place] [address] any warranty or liability for your use of this information. Try going gluten-free for 1-2 months. http://nuMVC/blog/2011/03/17/gluten-what-you-dont-know-might-kill-you/  https://[employer]. com/gluten-leaky-gut/  Robbzine.at. com/eat/list-of-gluten-free-foods/  https://[employer].Autoniq/almond-flour/

## 2018-02-08 NOTE — PROGRESS NOTES
Rm 7  Pt presents to the clinic complaining of cough and cold symptoms. Pt also complains of \"itchy patches\" on his wrist, legs and bottom. Flu Shot Requested: no    Depression Screening:  PHQ over the last two weeks 4/25/2017 3/24/2017 3/4/2017 1/31/2017   Little interest or pleasure in doing things Not at all Several days Not at all Not at all   Feeling down, depressed or hopeless Not at all More than half the days Not at all Not at all   Total Score PHQ 2 0 3 0 0       Learning Assessment:  Learning Assessment 3/24/2017 1/31/2017   PRIMARY LEARNER Patient Patient   HIGHEST LEVEL OF EDUCATION - PRIMARY LEARNER  2 [place] LEARNER NONE NONE   CO-LEARNER CAREGIVER No No   PRIMARY LANGUAGE ENGLISH ENGLISH    NEED - No   LEARNER PREFERENCE PRIMARY DEMONSTRATION DEMONSTRATION   LEARNING SPECIAL TOPICS - no   ANSWERED BY patient patient   RELATIONSHIP SELF SELF   ASSESSMENT COMMENT - none       Abuse Screening:  No flowsheet data found. Health Maintenance reviewed and discussed per provider: yes     Coordination of Care:    1. Have you been to the ER, urgent care clinic since your last visit? Hospitalized since your last visit? no    2. Have you seen or consulted any other health care providers outside of the [facility] since your last visit? Include any pap smears or colon screening.  no

## 2018-02-08 NOTE — ASSESSMENT & PLAN NOTE
This condition is managed by Specialist.(Josh Almanza MD)  Key Psychotherapeutic Meds             sertraline (ZOLOFT) 50 mg tablet  (Taking) Take 1 Tab by mouth daily. Other [address] [place] [address]     The patient is on no other behavioral health meds.         Lab Results   Component Value Date/Time    Sodium 143 04/25/2017 04:02 PM    Creatinine 1.0 04/25/2017 04:02 PM    TSH 1.28 04/25/2017 04:02 PM    WBC 4.9 04/25/2017 04:02 PM    ALT (SGPT) 31 04/25/2017 04:02 PM    AST (SGOT) 29 04/25/2017 04:02 PM

## 2018-02-08 NOTE — ASSESSMENT & PLAN NOTE
This condition is managed by Specialist. Linnea Roberto MD)  Key Psychotherapeutic Meds             sertraline (ZOLOFT) 50 mg tablet  (Taking) Take 1 Tab by mouth daily. Other [address] [place] [address]     The patient is on no other behavioral health meds.         Lab Results   Component Value Date/Time    Sodium 143 04/25/2017 04:02 PM    Creatinine 1.0 04/25/2017 04:02 PM    TSH 1.28 04/25/2017 04:02 PM    WBC 4.9 04/25/2017 04:02 PM    ALT (SGPT) 31 04/25/2017 04:02 PM    AST (SGOT) 29 04/25/2017 04:02 PM

## 2018-02-08 NOTE — LETTER
NOTIFICATION RETURN TO WORK  
 
2/8/2018 11:03 AM 
 
Mr. Aydee Mckay 
[address] [place] [phone] To Whom It May Concern: 
 
Aydee Mckay is currently under the care of [facility]. He will return to work on: 2/9/2017. If there are questions or concerns please have the patient contact our office. Sincerely, Radha Desai PA-C

## 2018-02-08 NOTE — MR AVS SNAPSHOT
[address] [place] [id number] 
[phone] Patient: Stephan Alicea 
MRN: [id number] Visit Information Date & Time Provider Department Dept. Phone Encounter #  
 2/8/2018 10:00 AM Naomi Hough PA-C [facility] [phone] [id number] Follow-up Instructions Return in about 4 weeks (around 3/8/2018) for blood pressure follow-up, med follow-up. Upcoming Health Maintenance Date Due Pneumococcal 19-64 Highest Risk (1 of 3 - PCV13) 1/1/2003 DTaP/Tdap/Td series (1 - Tdap) 1/1/2005 Allergies as of 2/8/2018  Review Complete On: 2/8/2018 By: Naomi Hough PA-C Severity Noted Reaction Type Reactions Atripla [Efavirenz-emtricitabin-tenofov]  01/13/2017    Nausea and Vomiting With hallucinations Fluoride Susy [Sodium Fluoride]  01/13/2017    Other (comments) Sore throat Current Immunizations  Never Reviewed Name Date Influenza Vaccine (Quad) PF 3/24/2017 Not reviewed this visit You Were Diagnosed With   
  
 Codes Comments Hypovitaminosis D    -  Primary ICD-10-CM: E55.9 ICD-9-CM: 268.9 Rash     ICD-10-CM: R21 
ICD-9-CM: 782.1 Anxiety     ICD-10-CM: F41.9 ICD-9-CM: 300.00 Chronic allergic rhinitis, unspecified seasonality, unspecified trigger     ICD-10-CM: J30.9 ICD-9-CM: 477.9 Vitals BP Pulse Temp Resp Height(growth percentile) Weight(growth percentile) (!) 137/92 (BP 1 Location: Right arm, BP Patient Position: Sitting) 92 96 °F (35.6 °C) (Oral) 18 5' 6\" (1.676 m) 150 lb (68 kg) SpO2 BMI Smoking Status 100% 24.21 kg/m2 Current Some Day Smoker Vitals History BMI and BSA Data Body Mass Index Body Surface Area  
 24.21 kg/m 2 1.78 m 2 Preferred Pharmacy Pharmacy Name Phone [facility] [address] Your Updated Medication List  
  
   
 This list is accurate as of: 2/8/18 11:02 AM.  Always use your most recent med list.  
  
  
  
  
 Cholecalciferol (Vitamin D3) 50,000 unit Cap Take 1 Cap by mouth every seven (7) days. ibuprofen 800 mg tablet Commonly known as:  MOTRIN Take 1 Tab by mouth every six (6) hours as needed for Pain.  
  
 levocetirizine 5 mg tablet Commonly known as:  Yarelis Dustin Take 1 Tab by mouth daily. PREZCOBIX PO Take 30 mg by mouth daily. Indications: HIV  
  
 sertraline 50 mg tablet Commonly known as:  ZOLOFT Take 1 Tab by mouth daily. TIVICAY 10 mg Tab Generic drug:  dolutegravir Take  by mouth. Prescriptions Sent to Pharmacy Refills  
 levocetirizine (XYZAL) 5 mg tablet 0 Sig: Take 1 Tab by mouth daily. Class: Normal  
 Pharmacy: [address] [place] [address], [address] [place] [address] #: [phone] Route: Oral  
 sertraline (ZOLOFT) 50 mg tablet 1 Sig: Take 1 Tab by mouth daily. Class: Normal  
 Pharmacy: [address] [place] [address], [address] [place] [address] Ph #: [phone] Route: Oral  
  
Follow-up Instructions Return in about 4 weeks (around 3/8/2018) for blood pressure follow-up, med follow-up. To-Do List   
 02/08/2018 Lab:  VITAMIN D, 25 HYDROXY Patient Instructions Take Xyzal daily until nasal and skin symptoms resolve. If the Xyzal is not helping with the ithcy skin, use some OTC hydrocortisone on the affected areas. Moisturize daily, especially after showering. Monitor for signs of infection (see below). Continue to monitor the red area at the roof of your mouth. Return with worsening signs of infection. Rash: Care Instructions Your Care Instructions A rash is any irritation or inflammation of the skin. Rashes have many possible causes, including allergy, infection, illness, heat, and emotional stress. Follow-up care is a key part of your treatment and safety.  Be sure to make and go to all appointments, and call your doctor if you are having problems. It's also a good idea to know your test results and keep a list of the medicines you take. How can you care for yourself at home? · Wash the area with water only. Soap can make dryness and itching worse. Pat dry. · Put cold, wet cloths on the rash to reduce itching. · Keep cool, and stay out of the sun. · Leave the rash open to the air as much of the time as possible. · Sometimes petroleum jelly (Vaseline) can help relieve the discomfort caused by a rash. A moisturizing lotion, such as Cetaphil, also may help. Calamine lotion may help for rashes caused by contact with something (such as a plant or soap) that irritated the skin. Use it 3 or 4 times a day. · If your doctor prescribed a cream, use it as directed. If your doctor prescribed medicine, take it exactly as directed. · If your rash itches so badly that it interferes with your normal activities, take an over-the-counter antihistamine, such as diphenhydramine (Benadryl) or loratadine (Claritin). Read and follow all instructions on the label. When should you call for help? Call your doctor now or seek immediate medical care if: 
? · You have signs of infection, such as: 
¨ Increased pain, swelling, warmth, or redness. ¨ Red streaks leading from the area. ¨ Pus draining from the area. ¨ A fever. ? · You have joint pain along with the rash. ? Watch closely for changes in your health, and be sure to contact your doctor if: 
? · Your rash is changing or getting worse. For example, call if you have pain along with the rash, the rash is spreading, or you have new blisters. ? · You do not get better after 1 week. Where can you learn more? Go to http://preston-gilbert.info/. Enter W503 in the search box to learn more about \"Rash: Care Instructions. \" Current as of: October 13, 2016 Content Version: 11.4 © 6280-3955 Healthwise, [employer]. Care instructions adapted under license by [employer] (which disclaims liability or warranty for this information). If you have questions about a medical condition or this instruction, always ask your healthcare professional. [address] any warranty or liability for your use of this information. Try going gluten-free for 1-2 months. http://Loaded Pocket/blog/2011/03/17/gluten-what-you-dont-know-might-kill-you/ 
https://Skycross. Acucar Guarani/gluten-leaky-gut/ 
PlumberMagazine.Vitrue/eat/list-of-gluten-free-foods/ 
https://Skycross.Acucar Guarani/almond-flour/ 
 
 
 
  
Introducing [facility]! Dear Nicolasa Dalton: 
Thank you for requesting a [employer] account. Our records indicate that you already have an active [employer] account. You can access your account anytime at [email or web address]://"[email or web address] Infinite Monkeys/"[employer]" Did you know that you can access your hospital and ER discharge instructions at any time in [employer]? You can also review all of your test results from your hospital stay or ER visit. Additional Information If you have questions, please visit the Frequently Asked Questions section of the [employer] website at [email or web address]://"[email or web address] Infinite Monkeys/"[employer]"/. Remember, [employer] is NOT to be used for urgent needs. For medical emergencies, dial 911. Now available from your iPhone and Android! Please provide this summary of care documentation to your next provider. If you have any questions after today's visit, please call [phone].

## 2018-02-08 NOTE — PROGRESS NOTES
HISTORY OF PRESENT ILLNESS  Lula Sarmiento is a 28 y.o. male. HPI Comments: Pt presents with c/o cold symptoms and itchy patches on his body. States the skin has been itchy in various places including, the left hand near the thumb, sides/back, behind the knees, and where he sits. There are some fun bumps present in the itchy areas. This has been ongoing for about 2.5 weeks. He admits to changing his body wash around that time, but has since changed back to his previous product and still has the itching. He has been having clear mucus drainage for the past month or so, which recently changed to  swollen glands around the corner of the left jaw and some soreness in the mouth on that side (near the roof). Denies any foul smell or drainage of puss in the mouth. States he has been taking Dayquil for his mucus production. He used to take a steroid nasal spray, but was advised by his pharmacist to stop it d/t potential increase in the levels of his HIV medications. He has been using a rinse of salt, lysine, and (something else) for the mouth sore. He would also like to resume his prescription of Zoloft. He was previously taking half at a time of 100 mg tablet for mood symptoms, but started to feel better and stopped the medication about 7-8 months ago. He notes an increase in his mood instability lately. Pt also hears voices at times, but indicates the Zoloft wasn't really helping that. He indicates that sometimes the voices are \"supportive,\" but that there are often multiple voices at a time, and that they can be annoying or otherwise disturbing. States he has been seeing Infectious Disease, and that they put him on a new medication, but he isn't sure what it's called; he is supposed to go back for some blood work in order to stay on it. Cough   Pertinent negatives include no chest pain, no headaches and no shortness of breath.    Nasal Congestion    Associated symptoms include congestion (with lots of clear drainage) and cough (intermittent). Pertinent negatives include no chills, no ear pain, no shortness of breath, no headaches and no chest pain. Skin Problem   Pertinent negatives include no chest pain, no headaches and no shortness of breath. Review of Systems   Constitutional: Positive for malaise/fatigue (past few days). Negative for chills and fever. HENT: Positive for congestion (with lots of clear drainage). Negative for ear pain.         + mild sinus pressure, helped by dayquil   Eyes: Negative for blurred vision, double vision and pain. Respiratory: Positive for cough (intermittent). Negative for sputum production, shortness of breath and wheezing. Cardiovascular: Negative for chest pain and palpitations. Gastrointestinal: Negative for diarrhea, nausea and vomiting. Musculoskeletal: Negative for myalgias. Neurological: Negative for weakness and headaches. Psychiatric/Behavioral: Positive for hallucinations (hears voices). The patient is nervous/anxious. Visit Vitals    BP (!) 137/92 (BP 1 Location: Right arm, BP Patient Position: Sitting)  Comment: blue cuff    Pulse 92    Temp 96 °F (35.6 °C) (Oral)    Resp 18    Ht 5' 6\" (1.676 m)    Wt 150 lb (68 kg)    SpO2 100%    BMI 24.21 kg/m2       Physical Exam   Constitutional: He is oriented to person, place, and time. He appears well-developed and well-nourished. He is cooperative. He does not appear ill. No distress. HENT:   Head: Normocephalic and atraumatic. Right Ear: Tympanic membrane, external ear and ear canal normal.   Left Ear: Tympanic membrane, external ear and ear canal normal.   Nose: Nose normal. No mucosal edema or rhinorrhea. Mouth/Throat: Uvula is midline, oropharynx is clear and moist and mucous membranes are normal. No oropharyngeal exudate or posterior oropharyngeal edema. Eyes: Conjunctivae are normal.   Neck: Neck supple.    Cardiovascular: Normal rate, regular rhythm and normal heart sounds. Exam reveals no gallop and no friction rub. No murmur heard. Pulses:       Radial pulses are 2+ on the right side, and 2+ on the left side. Pulmonary/Chest: Effort normal. No respiratory distress. He has no decreased breath sounds. He has wheezes (slight, upper lung fields). He has no rhonchi. He has no rales. Lymphadenopathy:        Head (left side): Tonsillar (mildly tender) adenopathy present. He has no cervical adenopathy. Neurological: He is alert and oriented to person, place, and time. Skin: Skin is warm and dry. Rash noted. He is not diaphoretic. Areas of mild excoriation noted on left wrist and right flank with some tiny palpable bumps, and one tiny pustule on the left hip area. Psychiatric: He has a normal mood and affect. His speech is normal and behavior is normal. Thought content normal.   Nursing note and vitals reviewed. ASSESSMENT and PLAN    ICD-10-CM ICD-9-CM    1. Chronic allergic rhinitis, unspecified seasonality, unspecified trigger J30.9 477.9 levocetirizine (XYZAL) 5 mg tablet   2. Rash R21 782.1 levocetirizine (XYZAL) 5 mg tablet   3. Hypovitaminosis D E55.9 268.9 VITAMIN D, 25 HYDROXY      VITAMIN D, 25 HYDROXY   4. Anxiety F41.9 300.00 sertraline (ZOLOFT) 50 mg tablet   5. Blood pressure elevated without history of HTN R03.0 796.2    6. Psychosis, unspecified psychosis type F29 298.9    7. Sore in mouth K13.79 528.9      1. Pt indicates he tends to get some wheezing with his allergy symptoms. Likely allergy/eczema picture. Indicates his symptoms are improved since he stopped consuming bovine dairy products. Given the allergy, eczema, and psychosis I suggest a trial elimination of gluten for 1-2 months. 2. Mild rash. Should improve with antihistamine. Recommended topical OTC hydrocortisone or benadryl as an adjunct if needed. May also improve with elimination of gluten. Moisturize daily, especially after showering.  Provided after-visit information on Rash. Reviewed reasons to return or seek emergency care. 3. Will follow and advise. May have a role in immune status as well as allergy/eczema symptoms. 4. Re-initiated zoloft per pt's request. Suggested low-glycemic diet in addition to gluten elimination to minimize mood swings secondary to fluctuations in blood sugar. 5. Pt has had some elevated readings in the past, but without diagnosis of hypertension. Return in 1 month for recheck. 6. Monitor for changes with gluten-free diet and zoloft. Consider referral to psychiatry if pt is not already active with one.   7. Appears fairly benign, but recommended careful monitoring given pt's HIV status. Reviewed signs of infection & encouraged pt to return with any worsening signs. Pt verbalized understanding and agreement with the plan of care.     Nadia Goldmann, PA-C

## 2018-02-09 DIAGNOSIS — E55.9 HYPOVITAMINOSIS D: ICD-10-CM

## 2018-02-09 LAB — 25(OH)D3 SERPL-MCNC: 19.1 NG/ML (ref 32–100)

## 2018-02-09 RX ORDER — ASPIRIN 325 MG
1 TABLET, DELAYED RELEASE (ENTERIC COATED) ORAL
Qty: 16 CAP | Refills: 0 | Status: SHIPPED | OUTPATIENT
Start: 2018-02-09

## 2018-02-23 ENCOUNTER — TELEPHONE (OUTPATIENT)
Dept: FAMILY MEDICINE CLINIC | Age: 33
End: 2018-02-23

## 2018-02-23 NOTE — TELEPHONE ENCOUNTER
Pt called in with c/o ringing in both ears. States it started with the right ear about three days ago. He has been having some congestion/drainage issues lately, for which he started taking an antihistamine following our last visit (2/8/18). States he put some drops in his ear because he was concerned that he might be getting an ear infection. The ringing also corresponds with some increased jaw pain. States the last time he had ringing in his ears like this was about a year ago; that it was diagnosed as an ear infection and it cleared up with antibiotics. Pt also c/o of thick nasal drainage that is turning green. Denies fever, chills, body aches, headache, nausea, vomiting, dizziness. Advised him to stop the antihistamines for now and start some phenylephrine and a nasal saline flush. If symptoms fail to improve on phenylephrine after a couple of days, switch to pseudoephedrine. If still not better by the beginning of next week, come it to get checked out; if markedly worse tomorrow, go to [facility]. Pt verbalized understanding and agreement with the plan; and thanked me for taking the call. Pt also acknowledged the vitamin D prescription and indicated he has started taking them once weekly as prescribed.

## 2018-02-23 NOTE — TELEPHONE ENCOUNTER
Called pt to ask if he got Denis's MyChart message and if he ever got the vitamin D from the pharmacy. Pt did not answer. Left a message to call back.

## 2018-04-02 ENCOUNTER — OFFICE VISIT (OUTPATIENT)
Dept: INTERNAL MEDICINE CLINIC | Age: 33
End: 2018-04-02

## 2018-04-02 VITALS
TEMPERATURE: 97.8 F | OXYGEN SATURATION: 96 % | BODY MASS INDEX: 24.75 KG/M2 | WEIGHT: 154 LBS | HEART RATE: 77 BPM | RESPIRATION RATE: 16 BRPM | DIASTOLIC BLOOD PRESSURE: 85 MMHG | SYSTOLIC BLOOD PRESSURE: 138 MMHG | HEIGHT: 66 IN

## 2018-04-02 DIAGNOSIS — T78.40XA ALLERGIC REACTION, INITIAL ENCOUNTER: Primary | ICD-10-CM

## 2018-04-02 DIAGNOSIS — R21 RASH: ICD-10-CM

## 2018-04-02 RX ORDER — DIPHENHYDRAMINE HYDROCHLORIDE 50 MG/ML
50 INJECTION, SOLUTION INTRAMUSCULAR; INTRAVENOUS ONCE
Qty: 1 VIAL | Refills: 0
Start: 2018-04-02 | End: 2018-04-02

## 2018-04-02 RX ORDER — HYDROXYZINE HYDROCHLORIDE 10 MG/1
10 TABLET, FILM COATED ORAL
Qty: 20 TAB | Refills: 0 | Status: SHIPPED | OUTPATIENT
Start: 2018-04-02 | End: 2018-04-12

## 2018-04-02 NOTE — MR AVS SNAPSHOT
[address] [facility] 
 
 
 [address] [facility] [address] [id number] 
[phone] Patient: Manohar Gonzáles 
MRN: [id number] [facility]:[birth year] Visit Information Date & Time Provider Department Dept. Phone Encounter #  
 4/2/2018  2:00 PM Ingrid Hernandez, [address] [facility] [phone] [id number] Follow-up Instructions Return if symptoms worsen or fail to improve. Upcoming Health Maintenance Date Due Pneumococcal 19-64 Highest Risk (1 of 3 - PCV13) 1/1/2003 DTaP/Tdap/Td series (1 - Tdap) 1/1/2005 Allergies as of 4/2/2018  Review Complete On: 4/2/2018 By: Keven Mendez Severity Noted Reaction Type Reactions Atripla [Efavirenz-emtricitabin-tenofov]  01/13/2017    Nausea and Vomiting With hallucinations Fluoride Susy [Sodium Fluoride]  01/13/2017    Other (comments) Sore throat Current Immunizations  Never Reviewed Name Date Influenza Vaccine (Quad) PF 3/24/2017 Not reviewed this visit You Were Diagnosed With   
  
 Codes Comments Allergic reaction, initial encounter    -  Primary ICD-10-CM: T78.40XA ICD-9-CM: 995.3 Rash     ICD-10-CM: R21 
ICD-9-CM: 782.1 Vitals BP Pulse Temp Resp Height(growth percentile) Weight(growth percentile) 138/85 (BP 1 Location: Right arm, BP Patient Position: Sitting) 77 97.8 °F (36.6 °C) (Oral) 16 5' 6\" (1.676 m) 154 lb (69.9 kg) SpO2 BMI Smoking Status 96% 24.86 kg/m2 Current Some Day Smoker Vitals History BMI and BSA Data Body Mass Index Body Surface Area  
 24.86 kg/m 2 1.8 m 2 Preferred Pharmacy Pharmacy Name Phone [facility] [address], [address] [facility] [address] [facility] Your Updated Medication List  
  
   
This list is accurate as of 4/2/18  2:49 PM.  Always use your most recent med list.  
  
  
  
  
 Cholecalciferol (Vitamin D3) 50,000 unit Cap Take 1 Cap by mouth every seven (7) days. Indications: VITAMIN D DEFICIENCY (HIGH DOSE THERAPY) diphenhydrAMINE 50 mg/mL injection Commonly known as:  BENADRYL  
1 mL by IntraVENous route once for 1 dose.  
  
 hydrOXYzine HCl 10 mg tablet Commonly known as:  ATARAX Take 1 Tab by mouth three (3) times daily as needed for Itching for up to 10 days. ibuprofen 800 mg tablet Commonly known as:  MOTRIN Take 1 Tab by mouth every six (6) hours as needed for Pain.  
  
 levocetirizine 5 mg tablet Commonly known as:  Marval Kitten Take 1 Tab by mouth daily. PREZCOBIX PO Take 30 mg by mouth daily. Indications: HIV  
  
 sertraline 50 mg tablet Commonly known as:  ZOLOFT Take 1 Tab by mouth daily. TIVICAY 10 mg Tab Generic drug:  dolutegravir Take  by mouth. Prescriptions Sent to Pharmacy Refills  
 hydrOXYzine HCl (ATARAX) 10 mg tablet 0 Sig: Take 1 Tab by mouth three (3) times daily as needed for Itching for up to 10 days. Class: Normal  
 Pharmacy: [address] [place] [address] #: [phone] Route: Oral  
  
We Performed the Following DIPHENHYDRAMINE HCL INJECTION <= 50 MG [ [facility]] [place] THER/PROPH/DIAG INJECTION, SUBCUT/IM [id number] CPT(R)] Follow-up Instructions Return if symptoms worsen or fail to improve. Patient Instructions Allergic Reaction: Care Instructions Your Care Instructions An allergic reaction is an excessive response from your immune system to a medicine, chemical, food, insect bite, or other substance. A reaction can range from mild to life-threatening. Some people have a mild rash, hives, and itching or stomach cramps. In severe reactions, swelling of your tongue and throat can close up your airway so that you cannot breathe. Follow-up care is a key part of your treatment and safety.  Be sure to make and go to all appointments, and call your doctor if you are having problems. It's also a good idea to know your test results and keep a list of the medicines you take. How can you care for yourself at home? · If you know what caused your allergic reaction, be sure to avoid it. Your allergy may become more severe each time you have a reaction. · Take an over-the-counter antihistamine, such as cetirizine (Zyrtec) or loratadine (Claritin), to treat mild symptoms. Read and follow directions on the label. Some antihistamines can make you feel sleepy. Do not give antihistamines to a child unless you have checked with your doctor first. Mild symptoms include sneezing or an itchy or runny nose; an itchy mouth; a few hives or mild itching; and mild nausea or stomach discomfort. · Do not scratch hives or a rash. Put a cold, moist towel on them or take cool baths to relieve itching. Put ice packs on hives, swelling, or insect stings for 10 to 15 minutes at a time. Put a thin cloth between the ice pack and your skin. Do not take hot baths or showers. They will make the itching worse. · Your doctor may prescribe a shot of epinephrine to carry with you in case you have a severe reaction. Learn how to give yourself the shot and keep it with you at all times. Make sure it is not . · Go to the emergency room every time you have a severe reaction, even if you have used your shot of epinephrine and are feeling better. Symptoms can come back after a shot. · Wear medical alert jewelry that lists your allergies. You can buy this at most [employer]. · If your child has a severe allergy, make sure that his or her teachers, babysitters, coaches, and other caregivers know about the allergy. They should have an epinephrine shot, know how and when to give it, and have a plan to take your child to the hospital. 
When should you call for help? Give an epinephrine shot if: 
? · You think you are having a severe allergic reaction. ? · You have symptoms in more than one body area, such as mild nausea and an itchy mouth. ? After giving an epinephrine shot call 911, even if you feel better. ?Call 911 if: 
? · You have symptoms of a severe allergic reaction. These may include: 
¨ Sudden raised, red areas (hives) all over your body. ¨ Swelling of the throat, mouth, lips, or tongue. ¨ Trouble breathing. ¨ Passing out (losing consciousness). Or you may feel very lightheaded or suddenly feel weak, confused, or restless. ? · You have been given an epinephrine shot, even if you feel better. ?Call your doctor now or seek immediate medical care if: 
? · You have symptoms of an allergic reaction, such as: ¨ A rash or hives (raised, red areas on the skin). ¨ Itching. ¨ Swelling. ¨ Belly pain, nausea, or vomiting. ? Watch closely for changes in your health, and be sure to contact your doctor if: 
? · You do not get better as expected. Where can you learn more? Go to http://preston-gilbert.info/. Enter X729 in the search box to learn more about \"Allergic Reaction: Care Instructions. \" Current as of: September 29, 2016 Content Version: 11.4 © 0456-0998 [employer]. Care instructions adapted under license by [employer] (which disclaims liability or warranty for this information). If you have questions about a medical condition or this instruction, always ask your healthcare professional. [place] [address] any warranty or liability for your use of this information. Rash: Care Instructions Your Care Instructions A rash is any irritation or inflammation of the skin. Rashes have many possible causes, including allergy, infection, illness, heat, and emotional stress. Follow-up care is a key part of your treatment and safety. Be sure to make and go to all appointments, and call your doctor if you are having problems.  It's also a good idea to know your test results and keep a list of the medicines you take. How can you care for yourself at home? · Wash the area with water only. Soap can make dryness and itching worse. Pat dry. · Put cold, wet cloths on the rash to reduce itching. · Keep cool, and stay out of the sun. · Leave the rash open to the air as much of the time as possible. · Sometimes petroleum jelly (Vaseline) can help relieve the discomfort caused by a rash. A moisturizing lotion, such as Cetaphil, also may help. Calamine lotion may help for rashes caused by contact with something (such as a plant or soap) that irritated the skin. Use it 3 or 4 times a day. · If your doctor prescribed a cream, use it as directed. If your doctor prescribed medicine, take it exactly as directed. · If your rash itches so badly that it interferes with your normal activities, take an over-the-counter antihistamine, such as diphenhydramine (Benadryl) or loratadine (Claritin). Read and follow all instructions on the label. When should you call for help? Call your doctor now or seek immediate medical care if: 
? · You have signs of infection, such as: 
¨ Increased pain, swelling, warmth, or redness. ¨ Red streaks leading from the area. ¨ Pus draining from the area. ¨ A fever. ? · You have joint pain along with the rash. ? Watch closely for changes in your health, and be sure to contact your doctor if: 
? · Your rash is changing or getting worse. For example, call if you have pain along with the rash, the rash is spreading, or you have new blisters. ? · You do not get better after 1 week. Where can you learn more? Go to http://preston-gilbert.info/. Enter J023 in the search box to learn more about \"Rash: Care Instructions. \" Current as of: October 13, 2016 Content Version: 11.4 © 5268-4022 [employer].  Care instructions adapted under license by [employer] (which disclaims liability or warranty for this information). If you have questions about a medical condition or this instruction, always ask your healthcare professional. [address] any warranty or liability for your use of this information. Introducing [facility]! Dear [place] Schulte: 
Thank you for requesting a Lockr account. Our records indicate that you already have an active Lockr account. You can access your account anytime at [email or web address] Did you know that you can access your hospital and ER discharge instructions at any time in Lockr? You can also review all of your test results from your hospital stay or ER visit. Additional Information If you have questions, please visit the Frequently Asked Questions section of the Lockr website at [email or web address] Remember, Lockr is NOT to be used for urgent needs. For medical emergencies, dial 911. Now available from your iPhone and Android! Please provide this summary of care documentation to your next provider. Your primary care clinician is listed as Enedelia Hough. If you have any questions after today's visit, please call [phone].

## 2018-04-02 NOTE — PROGRESS NOTES
Patient presents with fine itchy rash to bilateral arms x 1 day, states he started using a new detergent yesterday,denies any throat swelling,difficulty swallowing/breathing,dizziness,SOB,CP.

## 2018-04-02 NOTE — PROGRESS NOTES
ROOM # 8    Anne Reeves presents today for   Chief Complaint   Patient presents with    Hives     on both arms x 2 hours        Anne Reeves preferred language for health care discussion is english/other. Is someone accompanying this pt? no    Is the patient using any DME equipment during OV? no    Depression Screening:  PHQ over the last two weeks 4/25/2017 3/24/2017 3/4/2017 1/31/2017   Little interest or pleasure in doing things Not at all Several days Not at all Not at all   Feeling down, depressed or hopeless Not at all More than half the days Not at all Not at all   Total Score PHQ 2 0 3 0 0       Learning Assessment:  Learning Assessment 3/24/2017 1/31/2017   PRIMARY LEARNER Patient Patient   HIGHEST LEVEL OF EDUCATION - PRIMARY LEARNER  [address] [place] LEARNER NONE NONE   CO-LEARNER CAREGIVER No No   PRIMARY LANGUAGE ENGLISH ENGLISH    NEED - No   LEARNER PREFERENCE PRIMARY DEMONSTRATION DEMONSTRATION   LEARNING SPECIAL TOPICS - no   ANSWERED BY patient patient   RELATIONSHIP SELF SELF   ASSESSMENT COMMENT - none       Abuse Screening:  No flowsheet data found. Fall Risk  Fall Risk Assessment, last 12 mths 9/18/2017   Able to walk? Yes   Fall in past 12 months? Yes   Fall with injury? Yes   Number of falls in past 12 months 1   Fall Risk Score 2       Health Maintenance reviewed and discussed per provider. Yes    Anne Reeves is due for nothing . Please order/place referral if appropriate. Advance Directive:  1. Do you have an advance directive in place? Patient Reply: no    2. If not, would you like material regarding how to put one in place? Patient Reply: no    Coordination of Care:  1. Have you been to the ER, urgent care clinic since your last visit? Hospitalized since your last visit? no    2. Have you seen or consulted any other health care providers outside of the [address] [place] [address] since your last visit?  Include any pap smears or colon screening.  no

## 2018-04-02 NOTE — PROGRESS NOTES
HISTORY OF PRESENT ILLNESS  Jt Woods is a 28 y.o. male. Patient presents with fine itchy rash to bilateral arms x 1 day, states he started using a new detergent yesterday,denies any throat swelling,difficulty swallowing/breathing,dizziness,SOB,CP. HPI    Review of Systems   Constitutional: Negative. HENT: Negative. Eyes: Negative. Respiratory: Negative. Cardiovascular: Negative. Gastrointestinal: Negative. Genitourinary: Negative. Musculoskeletal: Negative. Skin: Positive for itching and rash. Fine erythematous pruritic elevations to right fore arm and dorsum of right hand   Neurological: Negative. Psychiatric/Behavioral: Negative. Physical Exam   Constitutional: He is oriented to person, place, and time. He appears well-developed and well-nourished. /85 (BP 1 Location: Right arm, BP Patient Position: Sitting)  Pulse 77  Temp 97.8 °F (36.6 °C) (Oral)   Resp 16  Ht 5' 6\" (1.676 m)  Wt 154 lb (69.9 kg)  SpO2 96%  BMI 24.86 kg/m2     HENT:   Head: Normocephalic and atraumatic. Eyes: Conjunctivae and EOM are normal. Pupils are equal, round, and reactive to light. Neck: Normal range of motion. Cardiovascular: Normal rate. Pulmonary/Chest: Effort normal and breath sounds normal.   Musculoskeletal: Normal range of motion. Neurological: He is alert and oriented to person, place, and time. GCS eye subscore is 4. GCS verbal subscore is 5. GCS motor subscore is 6. Skin: Skin is warm and dry. Rash noted. There is erythema. Psychiatric: He has a normal mood and affect. His speech is normal and behavior is normal. Judgment and thought content normal. Cognition and memory are normal.   Vitals reviewed. ASSESSMENT and PLAN    ICD-10-CM ICD-9-CM    1.  Allergic reaction, initial encounter T78.40XA 995.3 hydrOXYzine HCl (ATARAX) 10 mg tablet      DIPHENHYDRAMINE HCL INJECTION <= 50 MG      [place] THER/PROPH/DIAG INJECTION, SUBCUT/IM      diphenhydrAMINE (BENADRYL) 50 mg/mL injection   2. Rash R21 782.1 hydrOXYzine HCl (ATARAX) 10 mg tablet      DIPHENHYDRAMINE HCL INJECTION <= 50 MG      ME THER/PROPH/DIAG INJECTION, SUBCUT/IM      diphenhydrAMINE (BENADRYL) 50 mg/mL injection     Encounter Diagnoses   Name Primary?  Allergic reaction, initial encounter Yes    Rash      Orders Placed This Encounter    hydrOXYzine HCl (ATARAX) 10 mg tablet    diphenhydrAMINE (BENADRYL) 50 mg/mL injection     Orders Placed This Encounter    DIPHENHYDRAMINE HCL INJECTION <= 50 MG     Order Specific Question:   Dose     Answer:   50mg/ml     Order Specific Question:   Site     Answer:   LEFT GLUTEUS     Order Specific Question:   Expiration Date     Answer:   2018     Order Specific Question:   Lot#     Answer:   [id number]     Order Specific Question:        Answer:   [employer]     Order Specific Question:   Charge Quantity? Answer:   2     Order Specific Question:   Perfomed by/Witnessed by: Answer:   NORMAN Leija LPN/ BRITT Rowe NP     Order Specific Question:   NDC#     Answer:   [id number]    ME THER/PROPH/DIAG INJECTION, SUBCUT/IM    hydrOXYzine HCl (ATARAX) 10 mg tablet     Sig: Take 1 Tab by mouth three (3) times daily as needed for Itching for up to 10 days. Dispense:  20 Tab     Refill:  0    diphenhydrAMINE (BENADRYL) 50 mg/mL injection     Si mL by IntraVENous route once for 1 dose. Dispense:  1 Vial     Refill:  0     Orders Placed This Encounter    DIPHENHYDRAMINE HCL INJECTION <= 50 MG    ME THER/PROPH/DIAG INJECTION, SUBCUT/IM    hydrOXYzine HCl (ATARAX) 10 mg tablet    diphenhydrAMINE (BENADRYL) 50 mg/mL injection     Diagnoses and all orders for this visit:    1. Allergic reaction, initial encounter  -     hydrOXYzine HCl (ATARAX) 10 mg tablet;  Take 1 Tab by mouth three (3) times daily as needed for Itching for up to 10 days.  -     DIPHENHYDRAMINE HCL INJECTION <= 50 MG  -     ME THER/PROPH/DIAG INJECTION, SUBCUT/IM  - diphenhydrAMINE (BENADRYL) 50 mg/mL injection; 1 mL by IntraVENous route once for 1 dose. 2. Rash  -     hydrOXYzine HCl (ATARAX) 10 mg tablet; Take 1 Tab by mouth three (3) times daily as needed for Itching for up to 10 days.  -     DIPHENHYDRAMINE HCL INJECTION <= 50 MG  -     [place] THER/PROPH/DIAG INJECTION, SUBCUT/IM  -     diphenhydrAMINE (BENADRYL) 50 mg/mL injection; 1 mL by IntraVENous route once for 1 dose. Follow-up Disposition:  Return if symptoms worsen or fail to improve. the following changes in treatment are made: Go to the ED with any throat swelling,difficulty talking,swallowing or breathing.

## 2018-04-02 NOTE — PATIENT INSTRUCTIONS
Allergic Reaction: Care Instructions  Your Care Instructions    An allergic reaction is an excessive response from your immune system to a medicine, chemical, food, insect bite, or other substance. A reaction can range from mild to life-threatening. Some people have a mild rash, hives, and itching or stomach cramps. In severe reactions, swelling of your tongue and throat can close up your airway so that you cannot breathe. Follow-up care is a key part of your treatment and safety. Be sure to make and go to all appointments, and call your doctor if you are having problems. It's also a good idea to know your test results and keep a list of the medicines you take. How can you care for yourself at home? · If you know what caused your allergic reaction, be sure to avoid it. Your allergy may become more severe each time you have a reaction. · Take an over-the-counter antihistamine, such as cetirizine (Zyrtec) or loratadine (Claritin), to treat mild symptoms. Read and follow directions on the label. Some antihistamines can make you feel sleepy. Do not give antihistamines to a child unless you have checked with your doctor first. Mild symptoms include sneezing or an itchy or runny nose; an itchy mouth; a few hives or mild itching; and mild nausea or stomach discomfort. · Do not scratch hives or a rash. Put a cold, moist towel on them or take cool baths to relieve itching. Put ice packs on hives, swelling, or insect stings for 10 to 15 minutes at a time. Put a thin cloth between the ice pack and your skin. Do not take hot baths or showers. They will make the itching worse. · Your doctor may prescribe a shot of epinephrine to carry with you in case you have a severe reaction. Learn how to give yourself the shot and keep it with you at all times. Make sure it is not . · Go to the emergency room every time you have a severe reaction, even if you have used your shot of epinephrine and are feeling better. Symptoms can come back after a shot. · Wear medical alert jewelry that lists your allergies. You can buy this at most drugstores. · If your child has a severe allergy, make sure that his or her teachers, babysitters, coaches, and other caregivers know about the allergy. They should have an epinephrine shot, know how and when to give it, and have a plan to take your child to the hospital.  When should you call for help? Give an epinephrine shot if:  ? · You think you are having a severe allergic reaction. ? · You have symptoms in more than one body area, such as mild nausea and an itchy mouth. ? After giving an epinephrine shot call 911, even if you feel better. ?Call 911 if:  ? · You have symptoms of a severe allergic reaction. These may include:  ¨ Sudden raised, red areas (hives) all over your body. ¨ Swelling of the throat, mouth, lips, or tongue. ¨ Trouble breathing. ¨ Passing out (losing consciousness). Or you may feel very lightheaded or suddenly feel weak, confused, or restless. ? · You have been given an epinephrine shot, even if you feel better. ?Call your doctor now or seek immediate medical care if:  ? · You have symptoms of an allergic reaction, such as:  ¨ A rash or hives (raised, red areas on the skin). ¨ Itching. ¨ Swelling. ¨ Belly pain, nausea, or vomiting. ? Watch closely for changes in your health, and be sure to contact your doctor if:  ? · You do not get better as expected. Where can you learn more? Go to http://preston-gilbert.info/. Enter L953 in the search box to learn more about \"Allergic Reaction: Care Instructions. \"  Current as of: September 29, 2016  Content Version: 11.4  © 8437-2940 [employer]. Care instructions adapted under license by [employer] (which disclaims liability or warranty for this information).  If you have questions about a medical condition or this instruction, always ask your healthcare professional. Healthwise, [facility] disclaims any warranty or liability for your use of this information. Rash: Care Instructions  Your Care Instructions  A rash is any irritation or inflammation of the skin. Rashes have many possible causes, including allergy, infection, illness, heat, and emotional stress. Follow-up care is a key part of your treatment and safety. Be sure to make and go to all appointments, and call your doctor if you are having problems. It's also a good idea to know your test results and keep a list of the medicines you take. How can you care for yourself at home? · Wash the area with water only. Soap can make dryness and itching worse. Pat dry. · Put cold, wet cloths on the rash to reduce itching. · Keep cool, and stay out of the sun. · Leave the rash open to the air as much of the time as possible. · Sometimes petroleum jelly (Vaseline) can help relieve the discomfort caused by a rash. A moisturizing lotion, such as Cetaphil, also may help. Calamine lotion may help for rashes caused by contact with something (such as a plant or soap) that irritated the skin. Use it 3 or 4 times a day. · If your doctor prescribed a cream, use it as directed. If your doctor prescribed medicine, take it exactly as directed. · If your rash itches so badly that it interferes with your normal activities, take an over-the-counter antihistamine, such as diphenhydramine (Benadryl) or loratadine (Claritin). Read and follow all instructions on the label. When should you call for help? Call your doctor now or seek immediate medical care if:  ? · You have signs of infection, such as:  ¨ Increased pain, swelling, warmth, or redness. ¨ Red streaks leading from the area. ¨ Pus draining from the area. ¨ A fever. ? · You have joint pain along with the rash. ? Watch closely for changes in your health, and be sure to contact your doctor if:  ? · Your rash is changing or getting worse.  For example, call if you have pain along with the rash, the rash is spreading, or you have new blisters. ? · You do not get better after 1 week. Where can you learn more? Go to http://preston-gilbert.info/. Enter G342 in the search box to learn more about \"Rash: Care Instructions. \"  Current as of: October 13, 2016  Content Version: 11.4  © 1481-3156 [employer]. Care instructions adapted under license by [employer] (which disclaims liability or warranty for this information). If you have questions about a medical condition or this instruction, always ask your healthcare professional. [place] [address] any warranty or liability for your use of this information.

## 2018-04-02 NOTE — LETTER
NOTIFICATION RETURN TO WORK / SCHOOL 
 
4/2/2018 2:31 PM 
 
Mr. Taqueria Ha 
[address] [place] [phone] To Whom It May Concern: 
 
Taqueria Ha is currently under the care of Ritika Sandoval. He will return to work/school on: 4/3/2018 If there are questions or concerns please have the patient contact our office. Sincerely, Helene Sharpe NP

## 2018-05-18 DIAGNOSIS — F41.9 ANXIETY: ICD-10-CM

## 2018-05-18 RX ORDER — SERTRALINE HYDROCHLORIDE 50 MG/1
50 TABLET, FILM COATED ORAL DAILY
Qty: 30 TAB | Refills: 1 | OUTPATIENT
Start: 2018-05-18

## 2018-05-18 NOTE — TELEPHONE ENCOUNTER
Requested Prescriptions     Pending Prescriptions Disp Refills    sertraline (ZOLOFT) 50 mg tablet 30 Tab 1     Sig: Take 1 Tab by mouth daily.

## 2018-05-23 DIAGNOSIS — F41.9 ANXIETY: ICD-10-CM

## 2018-05-23 RX ORDER — SERTRALINE HYDROCHLORIDE 50 MG/1
50 TABLET, FILM COATED ORAL DAILY
Qty: 30 TAB | Refills: 1 | Status: SHIPPED | OUTPATIENT
Start: 2018-05-23 | End: 2018-05-23 | Stop reason: SDUPTHER

## 2018-05-25 RX ORDER — SERTRALINE HYDROCHLORIDE 50 MG/1
TABLET, FILM COATED ORAL
Qty: 30 TAB | Refills: 0 | Status: SHIPPED | OUTPATIENT
Start: 2018-05-25 | End: 2018-11-28 | Stop reason: SDUPTHER

## 2018-11-28 DIAGNOSIS — F41.9 ANXIETY: ICD-10-CM

## 2018-11-28 RX ORDER — SERTRALINE HYDROCHLORIDE 50 MG/1
TABLET, FILM COATED ORAL
Qty: 30 TAB | Refills: 0 | Status: SHIPPED | OUTPATIENT
Start: 2018-11-28